# Patient Record
Sex: FEMALE | Race: WHITE | NOT HISPANIC OR LATINO
[De-identification: names, ages, dates, MRNs, and addresses within clinical notes are randomized per-mention and may not be internally consistent; named-entity substitution may affect disease eponyms.]

---

## 2020-08-20 PROBLEM — Z00.00 ENCOUNTER FOR PREVENTIVE HEALTH EXAMINATION: Status: ACTIVE | Noted: 2020-08-20

## 2020-08-21 ENCOUNTER — APPOINTMENT (OUTPATIENT)
Dept: INTERVENTIONAL RADIOLOGY/VASCULAR | Facility: CLINIC | Age: 35
End: 2020-08-21

## 2020-09-11 ENCOUNTER — APPOINTMENT (OUTPATIENT)
Dept: MAMMOGRAPHY | Facility: CLINIC | Age: 35
End: 2020-09-11

## 2022-07-13 ENCOUNTER — OUTPATIENT (OUTPATIENT)
Dept: OUTPATIENT SERVICES | Facility: HOSPITAL | Age: 37
LOS: 1 days | End: 2022-07-13
Payer: COMMERCIAL

## 2022-07-13 VITALS
DIASTOLIC BLOOD PRESSURE: 69 MMHG | OXYGEN SATURATION: 99 % | SYSTOLIC BLOOD PRESSURE: 115 MMHG | TEMPERATURE: 98 F | HEART RATE: 86 BPM | RESPIRATION RATE: 18 BRPM

## 2022-07-13 VITALS — SYSTOLIC BLOOD PRESSURE: 127 MMHG | HEART RATE: 83 BPM | OXYGEN SATURATION: 99 % | DIASTOLIC BLOOD PRESSURE: 77 MMHG

## 2022-07-13 DIAGNOSIS — O26.899 OTHER SPECIFIED PREGNANCY RELATED CONDITIONS, UNSPECIFIED TRIMESTER: ICD-10-CM

## 2022-07-13 DIAGNOSIS — Z3A.00 WEEKS OF GESTATION OF PREGNANCY NOT SPECIFIED: ICD-10-CM

## 2022-07-13 PROCEDURE — G0463: CPT

## 2022-07-13 PROCEDURE — 59025 FETAL NON-STRESS TEST: CPT

## 2022-07-13 NOTE — OB PROVIDER TRIAGE NOTE - ADDITIONAL INSTRUCTIONS
Resume normal prenatal care. Please call your doctor with any questions or concerns. Please report back to the hospital if you experience fevers, chills, nausea, vomiting, painful contractions, vaginal bleeding, loss of fluid or if you experience decreased fetal movement.

## 2022-07-13 NOTE — OB PROVIDER TRIAGE NOTE - NSHPPHYSICALEXAM_GEN_ALL_CORE
VS  T(C): 37.0 (07-13-22 @ 17:21)  HR: 83 (07-13-22 @ 17:28)  BP: 127/77 (07-13-22 @ 17:28)  RR: 18 (07-13-22 @ 17:21)  SpO2: 99% (07-13-22 @ 17:28)    Gen: NAD  Resp: unlabored on RA  CV: RR  GI: soft, nontender, gravid    SSE: - pooling, - ferning, - nitrazine; cervix long and closed; physiologic discharge  BPP: 8/8, vertex, posterior, MVP 4.2    FHT: 140, mod, + accels, - decels  [place]: no CTX

## 2022-07-13 NOTE — OB RN TRIAGE NOTE - FALL HARM RISK - UNIVERSAL INTERVENTIONS
Bed in lowest position, wheels locked, appropriate side rails in place/Call bell, personal items and telephone in reach/Instruct patient to call for assistance before getting out of bed or chair/Non-slip footwear when patient is out of bed/[place] to call system/Physically safe environment - no spills, clutter or unnecessary equipment/Purposeful Proactive Rounding/Room/bathroom lighting operational, light cord in reach

## 2022-07-13 NOTE — OB PROVIDER TRIAGE NOTE - HISTORY OF PRESENT ILLNESS
35yo  at 28 5/ (ISHA ) presenting with complaints of leakage of fluid. Pt reports feeling wet for the past 1 month. Denies large gush of fluid. Reports good fetal movement. Denies vaginal bleeding, denies contractions.    PNC: IVF pregnancy  diagnosed with gestational thrombocytopenia, last platelet level 122 per patient    OBHx: ectopic s/p MTX  GynHx: <1cm fibroids, complex cyst (unknown size/side), history of abnormal pap smears s/p colposcopy in the past, denies STIs  PMH: denies  PSH: denies  Social: denies anxiety/depression  Meds: PNV, ASA  All: NKDA

## 2022-10-02 ENCOUNTER — INPATIENT (INPATIENT)
Facility: HOSPITAL | Age: 37
LOS: 2 days | Discharge: HOME | End: 2022-10-05
Attending: OBSTETRICS & GYNECOLOGY | Admitting: OBSTETRICS & GYNECOLOGY
Payer: COMMERCIAL

## 2022-10-02 VITALS
RESPIRATION RATE: 17 BRPM | SYSTOLIC BLOOD PRESSURE: 138 MMHG | TEMPERATURE: 98 F | DIASTOLIC BLOOD PRESSURE: 89 MMHG | HEART RATE: 96 BPM | OXYGEN SATURATION: 99 %

## 2022-10-02 DIAGNOSIS — O26.899 OTHER SPECIFIED PREGNANCY RELATED CONDITIONS, UNSPECIFIED TRIMESTER: ICD-10-CM

## 2022-10-02 DIAGNOSIS — Z34.80 ENCOUNTER FOR SUPERVISION OF OTHER NORMAL PREGNANCY, UNSPECIFIED TRIMESTER: ICD-10-CM

## 2022-10-02 DIAGNOSIS — Z3A.00 WEEKS OF GESTATION OF PREGNANCY NOT SPECIFIED: ICD-10-CM

## 2022-10-02 LAB
ALBUMIN SERPL ELPH-MCNC: 3.8 G/DL — SIGNIFICANT CHANGE UP (ref 3.3–5)
ALP SERPL-CCNC: 174 U/L — HIGH (ref 40–120)
ALT FLD-CCNC: 12 U/L — SIGNIFICANT CHANGE UP (ref 10–45)
ANION GAP SERPL CALC-SCNC: 14 MMOL/L — SIGNIFICANT CHANGE UP (ref 5–17)
APPEARANCE UR: CLEAR — SIGNIFICANT CHANGE UP
APTT BLD: 24.9 SEC — LOW (ref 27.5–35.5)
AST SERPL-CCNC: 12 U/L — SIGNIFICANT CHANGE UP (ref 10–40)
BACTERIA # UR AUTO: NEGATIVE — SIGNIFICANT CHANGE UP
BASOPHILS # BLD AUTO: 0.03 K/UL — SIGNIFICANT CHANGE UP (ref 0–0.2)
BASOPHILS NFR BLD AUTO: 0.2 % — SIGNIFICANT CHANGE UP (ref 0–2)
BILIRUB SERPL-MCNC: 0.1 MG/DL — LOW (ref 0.2–1.2)
BILIRUB UR-MCNC: NEGATIVE — SIGNIFICANT CHANGE UP
BLD GP AB SCN SERPL QL: NEGATIVE — SIGNIFICANT CHANGE UP
BUN SERPL-MCNC: 9 MG/DL — SIGNIFICANT CHANGE UP (ref 7–23)
CALCIUM SERPL-MCNC: 9.2 MG/DL — SIGNIFICANT CHANGE UP (ref 8.4–10.5)
CHLORIDE SERPL-SCNC: 103 MMOL/L — SIGNIFICANT CHANGE UP (ref 96–108)
CO2 SERPL-SCNC: 18 MMOL/L — LOW (ref 22–31)
COLOR SPEC: YELLOW — SIGNIFICANT CHANGE UP
COVID-19 SPIKE DOMAIN AB INTERP: POSITIVE
COVID-19 SPIKE DOMAIN ANTIBODY RESULT: >250 U/ML — HIGH
CREAT ?TM UR-MCNC: 156 MG/DL — SIGNIFICANT CHANGE UP
CREAT SERPL-MCNC: 0.43 MG/DL — LOW (ref 0.5–1.3)
DIFF PNL FLD: ABNORMAL
EGFR: 128 ML/MIN/1.73M2 — SIGNIFICANT CHANGE UP
EOSINOPHIL # BLD AUTO: 0.09 K/UL — SIGNIFICANT CHANGE UP (ref 0–0.5)
EOSINOPHIL NFR BLD AUTO: 0.7 % — SIGNIFICANT CHANGE UP (ref 0–6)
EPI CELLS # UR: 5 /HPF — SIGNIFICANT CHANGE UP
FIBRINOGEN PPP-MCNC: 577 MG/DL — HIGH (ref 330–520)
GLUCOSE SERPL-MCNC: 129 MG/DL — HIGH (ref 70–99)
GLUCOSE UR QL: ABNORMAL
HCT VFR BLD CALC: 37.3 % — SIGNIFICANT CHANGE UP (ref 34.5–45)
HGB BLD-MCNC: 13.3 G/DL — SIGNIFICANT CHANGE UP (ref 11.5–15.5)
HYALINE CASTS # UR AUTO: 4 /LPF — HIGH (ref 0–2)
IMM GRANULOCYTES NFR BLD AUTO: 0.6 % — SIGNIFICANT CHANGE UP (ref 0–0.9)
INR BLD: 0.93 RATIO — SIGNIFICANT CHANGE UP (ref 0.88–1.16)
KETONES UR-MCNC: SIGNIFICANT CHANGE UP
LDH SERPL L TO P-CCNC: 145 U/L — SIGNIFICANT CHANGE UP (ref 50–242)
LEUKOCYTE ESTERASE UR-ACNC: NEGATIVE — SIGNIFICANT CHANGE UP
LYMPHOCYTES # BLD AUTO: 0.97 K/UL — LOW (ref 1–3.3)
LYMPHOCYTES # BLD AUTO: 7.3 % — LOW (ref 13–44)
MCHC RBC-ENTMCNC: 31.7 PG — SIGNIFICANT CHANGE UP (ref 27–34)
MCHC RBC-ENTMCNC: 35.7 GM/DL — SIGNIFICANT CHANGE UP (ref 32–36)
MCV RBC AUTO: 89 FL — SIGNIFICANT CHANGE UP (ref 80–100)
MONOCYTES # BLD AUTO: 1.15 K/UL — HIGH (ref 0–0.9)
MONOCYTES NFR BLD AUTO: 8.6 % — SIGNIFICANT CHANGE UP (ref 2–14)
NEUTROPHILS # BLD AUTO: 11.04 K/UL — HIGH (ref 1.8–7.4)
NEUTROPHILS NFR BLD AUTO: 82.6 % — HIGH (ref 43–77)
NITRITE UR-MCNC: NEGATIVE — SIGNIFICANT CHANGE UP
NRBC # BLD: 0 /100 WBCS — SIGNIFICANT CHANGE UP (ref 0–0)
PH UR: 6 — SIGNIFICANT CHANGE UP (ref 5–8)
PLATELET # BLD AUTO: 133 K/UL — LOW (ref 150–400)
POTASSIUM SERPL-MCNC: 3.9 MMOL/L — SIGNIFICANT CHANGE UP (ref 3.5–5.3)
POTASSIUM SERPL-SCNC: 3.9 MMOL/L — SIGNIFICANT CHANGE UP (ref 3.5–5.3)
PROT ?TM UR-MCNC: 34 MG/DL — HIGH (ref 0–12)
PROT SERPL-MCNC: 6.6 G/DL — SIGNIFICANT CHANGE UP (ref 6–8.3)
PROT UR-MCNC: ABNORMAL
PROT/CREAT UR-RTO: 0.2 RATIO — SIGNIFICANT CHANGE UP (ref 0–0.2)
PROTHROM AB SERPL-ACNC: 10.8 SEC — SIGNIFICANT CHANGE UP (ref 10.5–13.4)
RBC # BLD: 4.19 M/UL — SIGNIFICANT CHANGE UP (ref 3.8–5.2)
RBC # FLD: 12.4 % — SIGNIFICANT CHANGE UP (ref 10.3–14.5)
RBC CASTS # UR COMP ASSIST: 49 /HPF — HIGH (ref 0–4)
RH IG SCN BLD-IMP: POSITIVE — SIGNIFICANT CHANGE UP
SARS-COV-2 IGG+IGM SERPL QL IA: >250 U/ML — HIGH
SARS-COV-2 IGG+IGM SERPL QL IA: POSITIVE
SARS-COV-2 RNA SPEC QL NAA+PROBE: SIGNIFICANT CHANGE UP
SODIUM SERPL-SCNC: 135 MMOL/L — SIGNIFICANT CHANGE UP (ref 135–145)
SP GR SPEC: 1.03 — HIGH (ref 1.01–1.02)
URATE SERPL-MCNC: 4.1 MG/DL — SIGNIFICANT CHANGE UP (ref 2.5–7)
UROBILINOGEN FLD QL: NEGATIVE — SIGNIFICANT CHANGE UP
WBC # BLD: 13.36 K/UL — HIGH (ref 3.8–10.5)
WBC # FLD AUTO: 13.36 K/UL — HIGH (ref 3.8–10.5)
WBC UR QL: 3 /HPF — SIGNIFICANT CHANGE UP (ref 0–5)

## 2022-10-02 RX ORDER — CHLORHEXIDINE GLUCONATE 213 G/1000ML
1 SOLUTION TOPICAL ONCE
Refills: 0 | Status: DISCONTINUED | OUTPATIENT
Start: 2022-10-02 | End: 2022-10-03

## 2022-10-02 RX ORDER — CITRIC ACID/SODIUM CITRATE 300-500 MG
15 SOLUTION, ORAL ORAL EVERY 6 HOURS
Refills: 0 | Status: DISCONTINUED | OUTPATIENT
Start: 2022-10-02 | End: 2022-10-03

## 2022-10-02 RX ORDER — INFLUENZA VIRUS VACCINE 15; 15; 15; 15 UG/.5ML; UG/.5ML; UG/.5ML; UG/.5ML
0.5 SUSPENSION INTRAMUSCULAR ONCE
Refills: 0 | Status: DISCONTINUED | OUTPATIENT
Start: 2022-10-02 | End: 2022-10-05

## 2022-10-02 RX ORDER — OXYTOCIN 10 UNIT/ML
333.33 VIAL (ML) INJECTION
Qty: 20 | Refills: 0 | Status: DISCONTINUED | OUTPATIENT
Start: 2022-10-02 | End: 2022-10-05

## 2022-10-02 RX ORDER — ONDANSETRON 8 MG/1
4 TABLET, FILM COATED ORAL ONCE
Refills: 0 | Status: COMPLETED | OUTPATIENT
Start: 2022-10-02 | End: 2022-10-02

## 2022-10-02 RX ORDER — OXYTOCIN 10 UNIT/ML
4 VIAL (ML) INJECTION
Qty: 30 | Refills: 0 | Status: DISCONTINUED | OUTPATIENT
Start: 2022-10-02 | End: 2022-10-05

## 2022-10-02 RX ORDER — SODIUM CHLORIDE 9 MG/ML
1000 INJECTION, SOLUTION INTRAVENOUS
Refills: 0 | Status: DISCONTINUED | OUTPATIENT
Start: 2022-10-02 | End: 2022-10-03

## 2022-10-02 RX ORDER — SODIUM CHLORIDE 9 MG/ML
1000 INJECTION, SOLUTION INTRAVENOUS
Refills: 0 | Status: DISCONTINUED | OUTPATIENT
Start: 2022-10-02 | End: 2022-10-05

## 2022-10-02 RX ADMIN — ONDANSETRON 4 MILLIGRAM(S): 8 TABLET, FILM COATED ORAL at 08:15

## 2022-10-02 RX ADMIN — Medication 15 MILLILITER(S): at 08:05

## 2022-10-02 RX ADMIN — Medication 15 MILLILITER(S): at 15:59

## 2022-10-02 RX ADMIN — Medication 4 MILLIUNIT(S)/MIN: at 23:36

## 2022-10-02 RX ADMIN — SODIUM CHLORIDE 125 MILLILITER(S): 9 INJECTION, SOLUTION INTRAVENOUS at 08:16

## 2022-10-02 RX ADMIN — ONDANSETRON 4 MILLIGRAM(S): 8 TABLET, FILM COATED ORAL at 08:05

## 2022-10-02 RX ADMIN — SODIUM CHLORIDE 125 MILLILITER(S): 9 INJECTION, SOLUTION INTRAVENOUS at 08:46

## 2022-10-02 NOTE — OB PROVIDER H&P - NSHPPHYSICALEXAM_GEN_ALL_CORE
Physical Exam  CV: RRR  Pulm: breathing comfortably on RA  Abd: gravid, nontender  Extr: moving all extremities with ease    – Spec: (-)pooling, (-)ferning, (-)nitrazine  – VE: 0.5/80/-3  – FHT: baseline 130, mod variability, +accels, -decels  – [place]: irregular, q10min  – EFW: 3122g by extrapolation from sono at 36w  – [place]: vertex

## 2022-10-02 NOTE — OB RN DELIVERY SUMMARY - NSSELHIDDEN_OBGYN_ALL_OB_FT
[NS_DeliveryAttending1_OBGYN_ALL_OB_FT:[id number]],[NS_DeliveryRN_OBGYN_ALL_OB_FT:[id number]]

## 2022-10-02 NOTE — OB PROVIDER TRIAGE NOTE - HISTORY OF PRESENT ILLNESS
R1 Triage H&P    Subjective  HPI: 37y  at 40w2d presents for leakage of fluid since 2am. +FM. +LOF. +CTXs. -VB. Pt denies any other concerns.    Patient endorses a gush of clear fluid around 2am, lightly saturating a pantiliner afterwards. Additionally started ly every 20 minutes, not requiring pain management currently.    – PNC: Gestational thrombocytopenia.  EFW 3122g by extrapolation from sono 2722g at 36w.  – OBHx: ectopic x1  – GynHx: denies fibroids, cysts, endometriosis, abnormal pap smears, STIs  – PMH: denies  – PSH: denies  – Psych: denies   – Social: denies   – Meds: PNV   – Allergies: NKDA  – Will accept blood transfusions? Yes    Objective  – VS  T(C): 36.7 (10-02-22 @ 03:19)  HR: 84 (10-02-22 @ 04:05)  BP: 120/74 (10-02-22 @ 04:05)  RR: 17 (10-02-22 @ 03:18)  SpO2: 95% (10-02-22 @ 04:04)    Physical Exam  CV: RRR  Pulm: breathing comfortably on RA  Abd: gravid, nontender  Extr: moving all extremities with ease    – Spec: (-)pooling, (-)ferning, (-)nitrazine  – VE: 0.5/80/-3  – FHT: baseline 130, mod variability, +accels, -decels  – [place]: irregular, q10min  – EFW: 3122g by extrapolation from sono at 36w  – [place]: vertex

## 2022-10-02 NOTE — PRE-ANESTHESIA EVALUATION ADULT - NSANTHOSAYNRD_GEN_A_CORE
No. NOAH screening performed.  STOP BANG Legend: 0-2 = LOW Risk; 3-4 = INTERMEDIATE Risk; 5-8 = HIGH Risk

## 2022-10-02 NOTE — OB PROVIDER LABOR PROGRESS NOTE - ASSESSMENT
36yo  @40+2 IOL for gHTN    - SVE: 10/100/0, OA  - FHT Cat 1, reassuring, ctm  - Peanut ball  - Anticipate     d/w Dr. Villavicencio who is en route  ENRIQUE F[place]on, PGY2

## 2022-10-02 NOTE — OB PROVIDER H&P - ATTENDING COMMENTS
Pt evaluated at bedside. IOL for gHTN by 2 elevated BPs 4hrs apart. No PEC symptoms    Cat 1     Plan  -PO cytotec  -Monitor BP  -HELLP labs  -epidural prn

## 2022-10-02 NOTE — OB PROVIDER H&P - HISTORY OF PRESENT ILLNESS
Subjective  HPI: 37y  at 40w2d presents for leakage of fluid since 2am. +FM. +LOF. +CTXs. -VB. Pt denies any other concerns.    Patient endorses a gush of clear fluid around 2am, lightly saturating a pantiliner afterwards. Additionally started ly every 20 minutes, not requiring pain management currently.    – PNC: Gestational thrombocytopenia.  EFW 3122g by extrapolation from sono 2722g at 36w.  – OBHx: ectopic x1  – GynHx: denies fibroids, cysts, endometriosis, abnormal pap smears, STIs  – PMH: denies  – PSH: denies  – Psych: denies   – Social: denies   – Meds: PNV   – Allergies: NKDA  – Will accept blood transfusions? Yes    Objective  – VS  T(C): 36.7 (10-02-22 @ 03:19)  HR: 84 (10-02-22 @ 04:05)  BP: 120/74 (10-02-22 @ 04:05)  RR: 17 (10-02-22 @ 03:18)  SpO2: 95% (10-02-22 @ 04:04)

## 2022-10-02 NOTE — OB RN DELIVERY SUMMARY - NS_SEPSISRSKCALC_OBGYN_ALL_OB_FT
EOS calculated successfully. EOS Risk Factor: 0.5/1000 live births ([facility] national incidence); GA=40w3d; Temp=99.68; ROM=10.95; GBS='Negative'; Antibiotics='No antibiotics or any antibiotics < 2 hrs prior to birth'

## 2022-10-02 NOTE — OB PROVIDER TRIAGE NOTE - ATTENDING COMMENTS
Pt evaluated at bedside. Now meets criteria for gHTN. No PEC symptoms. She is feeling intermittent ctx. Otherwise feeling well.     Cat 1 tracing    Plan  -PO Cytotec  -EFM + toco cat 1   -Epidural PRN  -HELLP Labs    ryan

## 2022-10-02 NOTE — OB RN TRIAGE NOTE - FALL HARM RISK - UNIVERSAL INTERVENTIONS
Bed in lowest position, wheels locked, appropriate side rails in place/Call bell, personal items and telephone in reach/Instruct patient to call for assistance before getting out of bed or chair/Non-slip footwear when patient is out of bed/[place] to call system/Physically safe environment - no spills, clutter or unnecessary equipment/Purposeful Proactive Rounding/Room/bathroom lighting operational, light cord in reach

## 2022-10-02 NOTE — OB RN DELIVERY SUMMARY - DELIVERY COMPLICATIONS; SHOULDER DYSTOCIA
L shoulder dystocia for 10 seconds. superpubic pressure applied and medrano corkscrew maneuver. delivered posterior arm.

## 2022-10-02 NOTE — OB PROVIDER H&P - ASSESSMENT
A&P: 37 year  presents for IOL 2/2 gHTN  Labor: admit to L&D  -PO cytotec  -routine labs  -EFM/toco  -NPO, IV hydration  Fetal: cat 1 tracing, fetal status reassuring  GBS: neg  Analgesia: Epi      d/w Dr Saud García PGY2

## 2022-10-02 NOTE — OB PROVIDER H&P - ATTENDING SUPERVISION STATEMENT
Please call mom and see if she is OK with referrals, I believe that she might benefit for services. Please ask how are the girls with the sores, thanks.    Resident

## 2022-10-02 NOTE — OB PROVIDER TRIAGE NOTE - NSOBPROVIDERNOTE_OBGYN_ALL_OB_FT
Assessment  37y  at 40w2d presents for rule out labor, rule out rupture - not ruptured on exam and with no cervical change, ly irregularly on monitor.    Plan  - SHANICE 5.4 w/ MVP 2.2, BPP 8/8, NST reactive and reassuring  - d/c with labor precautions  - Pt counseled on return to triage if experiencing decreased fetal movement, regular contractions every 3-4 minutes, leakage of fluid, vaginal bleeding    Plan per attending physician, Dr. Saud Olmos MD  PGY1 Assessment  37y  at 40w2d presents for rule out labor, rule out rupture - not ruptured on exam and with no cervical change, ly irregularly on monitor.    Plan  - SHANICE 5.4 w/ MVP 2.2, BPP 8/8, NST reactive and reassuring  - d/c with labor precautions  - Pt counseled on return to triage if experiencing decreased fetal movement, regular contractions every 3-4 minutes, leakage of fluid, vaginal bleeding    Addendum:  - Pt met criteria for gestational hypertension however refusing induction at this time  - HELLP labs sent, pending  - f/u plan pending HELLP labs    Plan per attending physician, Dr. Saud Olmos MD  PGY1

## 2022-10-02 NOTE — OB PROVIDER LABOR PROGRESS NOTE - NS_SUBJECTIVE/OBJECTIVE_OBGYN_ALL_OB_FT
Patient having intermittent late decels. Cytotec withheld because contractions difficult to trace. IUPC explained but was not placed because patient found to be fully.    T(C): 37.5 (10-02-22 @ 18:36), Max: 37.5 (10-02-22 @ 18:36)  HR: 109 (10-02-22 @ 21:22) (77 - 124)  BP: 129/70 (10-02-22 @ 21:20) (109/57 - 163/86)  RR: 18 (10-02-22 @ 18:36) (17 - 18)  SpO2: 100% (10-02-22 @ 21:22) (92% - 100%)

## 2022-10-03 LAB
BASOPHILS # BLD AUTO: 0 K/UL — SIGNIFICANT CHANGE UP (ref 0–0.2)
BASOPHILS NFR BLD AUTO: 0 % — SIGNIFICANT CHANGE UP (ref 0–2)
EOSINOPHIL # BLD AUTO: 0 K/UL — SIGNIFICANT CHANGE UP (ref 0–0.5)
EOSINOPHIL NFR BLD AUTO: 0 % — SIGNIFICANT CHANGE UP (ref 0–6)
HCT VFR BLD CALC: 20.2 % — CRITICAL LOW (ref 34.5–45)
HCT VFR BLD CALC: 23.1 % — LOW (ref 34.5–45)
HGB BLD-MCNC: 7 G/DL — CRITICAL LOW (ref 11.5–15.5)
HGB BLD-MCNC: 7.9 G/DL — LOW (ref 11.5–15.5)
LYMPHOCYTES # BLD AUTO: 0 % — LOW (ref 13–44)
LYMPHOCYTES # BLD AUTO: 0 K/UL — LOW (ref 1–3.3)
MANUAL SMEAR VERIFICATION: SIGNIFICANT CHANGE UP
MCHC RBC-ENTMCNC: 31.3 PG — SIGNIFICANT CHANGE UP (ref 27–34)
MCHC RBC-ENTMCNC: 31.6 PG — SIGNIFICANT CHANGE UP (ref 27–34)
MCHC RBC-ENTMCNC: 34.2 GM/DL — SIGNIFICANT CHANGE UP (ref 32–36)
MCHC RBC-ENTMCNC: 34.7 GM/DL — SIGNIFICANT CHANGE UP (ref 32–36)
MCV RBC AUTO: 90.2 FL — SIGNIFICANT CHANGE UP (ref 80–100)
MCV RBC AUTO: 92.4 FL — SIGNIFICANT CHANGE UP (ref 80–100)
METAMYELOCYTES # FLD: 0.9 % — HIGH (ref 0–0)
MONOCYTES # BLD AUTO: 0.67 K/UL — SIGNIFICANT CHANGE UP (ref 0–0.9)
MONOCYTES NFR BLD AUTO: 2.6 % — SIGNIFICANT CHANGE UP (ref 2–14)
NEUTROPHILS # BLD AUTO: 24.91 K/UL — HIGH (ref 1.8–7.4)
NEUTROPHILS NFR BLD AUTO: 93.9 % — HIGH (ref 43–77)
NEUTS BAND # BLD: 2.6 % — SIGNIFICANT CHANGE UP (ref 0–8)
NRBC # BLD: 0 /100 WBCS — SIGNIFICANT CHANGE UP (ref 0–0)
PLAT MORPH BLD: NORMAL — SIGNIFICANT CHANGE UP
PLATELET # BLD AUTO: 102 K/UL — LOW (ref 150–400)
PLATELET # BLD AUTO: 99 K/UL — LOW (ref 150–400)
RBC # BLD: 2.24 M/UL — LOW (ref 3.8–5.2)
RBC # BLD: 2.5 M/UL — LOW (ref 3.8–5.2)
RBC # FLD: 12.9 % — SIGNIFICANT CHANGE UP (ref 10.3–14.5)
RBC # FLD: 13 % — SIGNIFICANT CHANGE UP (ref 10.3–14.5)
RBC BLD AUTO: SIGNIFICANT CHANGE UP
T PALLIDUM AB TITR SER: NEGATIVE — SIGNIFICANT CHANGE UP
WBC # BLD: 16.89 K/UL — HIGH (ref 3.8–10.5)
WBC # BLD: 25.81 K/UL — HIGH (ref 3.8–10.5)
WBC # FLD AUTO: 16.89 K/UL — HIGH (ref 3.8–10.5)
WBC # FLD AUTO: 25.81 K/UL — HIGH (ref 3.8–10.5)

## 2022-10-03 RX ORDER — TETANUS TOXOID, REDUCED DIPHTHERIA TOXOID AND ACELLULAR PERTUSSIS VACCINE, ADSORBED 5; 2.5; 8; 8; 2.5 [IU]/.5ML; [IU]/.5ML; UG/.5ML; UG/.5ML; UG/.5ML
0.5 SUSPENSION INTRAMUSCULAR ONCE
Refills: 0 | Status: DISCONTINUED | OUTPATIENT
Start: 2022-10-03 | End: 2022-10-05

## 2022-10-03 RX ORDER — AER TRAVELER 0.5 G/1
1 SOLUTION RECTAL; TOPICAL EVERY 4 HOURS
Refills: 0 | Status: DISCONTINUED | OUTPATIENT
Start: 2022-10-03 | End: 2022-10-05

## 2022-10-03 RX ORDER — SODIUM CHLORIDE 9 MG/ML
1000 INJECTION, SOLUTION INTRAVENOUS ONCE
Refills: 0 | Status: COMPLETED | OUTPATIENT
Start: 2022-10-03 | End: 2022-10-03

## 2022-10-03 RX ORDER — DIPHENHYDRAMINE HCL 50 MG
25 CAPSULE ORAL EVERY 6 HOURS
Refills: 0 | Status: DISCONTINUED | OUTPATIENT
Start: 2022-10-03 | End: 2022-10-05

## 2022-10-03 RX ORDER — HYDROCORTISONE 1 %
1 OINTMENT (GRAM) TOPICAL EVERY 6 HOURS
Refills: 0 | Status: DISCONTINUED | OUTPATIENT
Start: 2022-10-03 | End: 2022-10-05

## 2022-10-03 RX ORDER — OXYCODONE HYDROCHLORIDE 5 MG/1
5 TABLET ORAL
Refills: 0 | Status: DISCONTINUED | OUTPATIENT
Start: 2022-10-03 | End: 2022-10-05

## 2022-10-03 RX ORDER — LANOLIN
1 OINTMENT (GRAM) TOPICAL EVERY 6 HOURS
Refills: 0 | Status: DISCONTINUED | OUTPATIENT
Start: 2022-10-03 | End: 2022-10-05

## 2022-10-03 RX ORDER — BENZOCAINE AND MENTHOL 5; 1 G/100ML; G/100ML
1 LIQUID ORAL EVERY 4 HOURS
Refills: 0 | Status: DISCONTINUED | OUTPATIENT
Start: 2022-10-03 | End: 2022-10-05

## 2022-10-03 RX ORDER — BENZOCAINE 10 %
1 GEL (GRAM) MUCOUS MEMBRANE EVERY 6 HOURS
Refills: 0 | Status: DISCONTINUED | OUTPATIENT
Start: 2022-10-03 | End: 2022-10-05

## 2022-10-03 RX ORDER — IBUPROFEN 200 MG
600 TABLET ORAL EVERY 6 HOURS
Refills: 0 | Status: DISCONTINUED | OUTPATIENT
Start: 2022-10-03 | End: 2022-10-05

## 2022-10-03 RX ORDER — MAGNESIUM HYDROXIDE 400 MG/1
30 TABLET, CHEWABLE ORAL
Refills: 0 | Status: DISCONTINUED | OUTPATIENT
Start: 2022-10-03 | End: 2022-10-05

## 2022-10-03 RX ORDER — IBUPROFEN 200 MG
600 TABLET ORAL EVERY 6 HOURS
Refills: 0 | Status: COMPLETED | OUTPATIENT
Start: 2022-10-03 | End: 2023-09-01

## 2022-10-03 RX ORDER — DIBUCAINE 1 %
1 OINTMENT (GRAM) RECTAL EVERY 6 HOURS
Refills: 0 | Status: DISCONTINUED | OUTPATIENT
Start: 2022-10-03 | End: 2022-10-05

## 2022-10-03 RX ORDER — PRAMOXINE HYDROCHLORIDE 150 MG/15G
1 AEROSOL, FOAM RECTAL EVERY 4 HOURS
Refills: 0 | Status: DISCONTINUED | OUTPATIENT
Start: 2022-10-03 | End: 2022-10-05

## 2022-10-03 RX ORDER — ACETAMINOPHEN 500 MG
975 TABLET ORAL
Refills: 0 | Status: DISCONTINUED | OUTPATIENT
Start: 2022-10-03 | End: 2022-10-05

## 2022-10-03 RX ORDER — SIMETHICONE 80 MG/1
80 TABLET, CHEWABLE ORAL EVERY 4 HOURS
Refills: 0 | Status: DISCONTINUED | OUTPATIENT
Start: 2022-10-03 | End: 2022-10-05

## 2022-10-03 RX ORDER — SODIUM CHLORIDE 9 MG/ML
3 INJECTION INTRAMUSCULAR; INTRAVENOUS; SUBCUTANEOUS EVERY 8 HOURS
Refills: 0 | Status: DISCONTINUED | OUTPATIENT
Start: 2022-10-03 | End: 2022-10-05

## 2022-10-03 RX ORDER — SENNA PLUS 8.6 MG/1
2 TABLET ORAL AT BEDTIME
Refills: 0 | Status: DISCONTINUED | OUTPATIENT
Start: 2022-10-03 | End: 2022-10-05

## 2022-10-03 RX ORDER — ERTAPENEM SODIUM 1 G/1
1000 INJECTION, POWDER, LYOPHILIZED, FOR SOLUTION INTRAMUSCULAR; INTRAVENOUS EVERY 24 HOURS
Refills: 0 | Status: DISCONTINUED | OUTPATIENT
Start: 2022-10-03 | End: 2022-10-04

## 2022-10-03 RX ORDER — OXYTOCIN 10 UNIT/ML
10 VIAL (ML) INJECTION ONCE
Refills: 0 | Status: COMPLETED | OUTPATIENT
Start: 2022-10-03 | End: 2022-10-03

## 2022-10-03 RX ORDER — OXYTOCIN 10 UNIT/ML
333.33 VIAL (ML) INJECTION
Qty: 20 | Refills: 0 | Status: DISCONTINUED | OUTPATIENT
Start: 2022-10-03 | End: 2022-10-05

## 2022-10-03 RX ORDER — KETOROLAC TROMETHAMINE 30 MG/ML
30 SYRINGE (ML) INJECTION ONCE
Refills: 0 | Status: DISCONTINUED | OUTPATIENT
Start: 2022-10-03 | End: 2022-10-03

## 2022-10-03 RX ADMIN — Medication 30 MILLIGRAM(S): at 04:22

## 2022-10-03 RX ADMIN — Medication 975 MILLIGRAM(S): at 10:45

## 2022-10-03 RX ADMIN — BENZOCAINE AND MENTHOL 1 LOZENGE: 5; 1 LIQUID ORAL at 22:39

## 2022-10-03 RX ADMIN — Medication 1 TABLET(S): at 12:15

## 2022-10-03 RX ADMIN — Medication 30 MILLIGRAM(S): at 03:45

## 2022-10-03 RX ADMIN — BENZOCAINE AND MENTHOL 1 LOZENGE: 5; 1 LIQUID ORAL at 10:43

## 2022-10-03 RX ADMIN — OXYCODONE HYDROCHLORIDE 5 MILLIGRAM(S): 5 TABLET ORAL at 10:00

## 2022-10-03 RX ADMIN — Medication 975 MILLIGRAM(S): at 18:27

## 2022-10-03 RX ADMIN — OXYCODONE HYDROCHLORIDE 5 MILLIGRAM(S): 5 TABLET ORAL at 09:30

## 2022-10-03 RX ADMIN — Medication 10 UNIT(S): at 01:34

## 2022-10-03 RX ADMIN — ERTAPENEM SODIUM 120 MILLIGRAM(S): 1 INJECTION, POWDER, LYOPHILIZED, FOR SOLUTION INTRAMUSCULAR; INTRAVENOUS at 03:49

## 2022-10-03 RX ADMIN — Medication 600 MILLIGRAM(S): at 10:00

## 2022-10-03 RX ADMIN — Medication 600 MILLIGRAM(S): at 01:04

## 2022-10-03 RX ADMIN — SENNA PLUS 2 TABLET(S): 8.6 TABLET ORAL at 22:39

## 2022-10-03 RX ADMIN — Medication 975 MILLIGRAM(S): at 12:16

## 2022-10-03 RX ADMIN — SODIUM CHLORIDE 1000 MILLILITER(S): 9 INJECTION, SOLUTION INTRAVENOUS at 04:16

## 2022-10-03 RX ADMIN — Medication 600 MILLIGRAM(S): at 15:54

## 2022-10-03 RX ADMIN — SODIUM CHLORIDE 3 MILLILITER(S): 9 INJECTION INTRAMUSCULAR; INTRAVENOUS; SUBCUTANEOUS at 22:46

## 2022-10-03 RX ADMIN — Medication 600 MILLIGRAM(S): at 20:51

## 2022-10-03 RX ADMIN — Medication 600 MILLIGRAM(S): at 21:30

## 2022-10-03 RX ADMIN — BENZOCAINE AND MENTHOL 1 LOZENGE: 5; 1 LIQUID ORAL at 15:54

## 2022-10-03 RX ADMIN — Medication 600 MILLIGRAM(S): at 16:45

## 2022-10-03 RX ADMIN — Medication 975 MILLIGRAM(S): at 05:17

## 2022-10-03 NOTE — CHART NOTE - NSCHARTNOTEFT_GEN_A_CORE
Pt is a 38yo  s/p  c/b gHTN, shoulder dystocia, 3 degree laceration, and PPT. EBL was 600.    MD called to bedside because pt's CBC resulted with an H/H 7.0/20.2.      LABS:  cret                        7.0    16.89 )-----------( 99       ( 03 Oct 2022 14:50 )             20.2                7.9    25.81 )-----------( 102       ( 03 Oct 2022 08:24 )             23.1                13.3    13.36 )-----------( 133       ( 02 Oct 2022 06:24 )             37.3     Vital Signs Last 24 Hrs  T(C): 36.8 (03 Oct 2022 13:01), Max: 38 (03 Oct 2022 02:40)  T(F): 98.2 (03 Oct 2022 13:01), Max: 100.4 (03 Oct 2022 02:40)  HR: 98 (03 Oct 2022 13:01) (85 - 153)  BP: 115/79 (03 Oct 2022 13:01) (97/53 - 197/128)  BP(mean): 98 (03 Oct 2022 10:30) (98 - 98)  RR: 18 (03 Oct 2022 13:01) (16 - 18)  SpO2: 98% (03 Oct 2022 13:01) (83% - 100%)    Physical Exam:  General: comfortable, NAD  Skin: pale, periorbital bruising and ruptured blood vessels in her eyes  Abd: Soft, fundus firm  Pelvic: lochia wnl    Pt seen and examined at the bedside. She denies any HA, chest pain, SOB, N/V, RUQ pain, dizziness, palpitations, lightheadedness with ambulation. She is tolerating PO and is urinating independently with adequate urine output. Her only complaint is fatigue and pain in her perineal area. Options were discussed with the patient, and she currently does not want a blood transfusion. She feels it is not necessary at this time, but is open to getting a CBC in the AM. All questions were answered to pt satisfaction. Pt instructed to let us know if she starts to have other or worsening symptoms and we will reevaluate.    Plan:  - Pt fatigued, otherwise asymptomatic  - Vital signs wnl  - AM CBC  - Continue to monitor    Pauline Whitney, PGY1  d/w Dr. Villavicencio R1 Postpartum Evaluation    Pt is a 38yo  s/p  c/b gHTN, shoulder dystocia, 3 degree laceration, and PPT. EBL was 600.    MD called to bedside because pt's CBC resulted with an H/H 7.0/20.2.      LABS:  cret                        7.0    16.89 )-----------( 99       ( 03 Oct 2022 14:50 )             20.2                7.9    25.81 )-----------( 102       ( 03 Oct 2022 08:24 )             23.1                13.3    13.36 )-----------( 133       ( 02 Oct 2022 06:24 )             37.3     Vital Signs Last 24 Hrs  T(C): 36.8 (03 Oct 2022 13:01), Max: 38 (03 Oct 2022 02:40)  T(F): 98.2 (03 Oct 2022 13:01), Max: 100.4 (03 Oct 2022 02:40)  HR: 98 (03 Oct 2022 13:01) (85 - 153)  BP: 115/79 (03 Oct 2022 13:01) (97/53 - 197/128)  BP(mean): 98 (03 Oct 2022 10:30) (98 - 98)  RR: 18 (03 Oct 2022 13:01) (16 - 18)  SpO2: 98% (03 Oct 2022 13:01) (83% - 100%)    Physical Exam:  General: comfortable, NAD  Skin: pale, periorbital bruising and ruptured blood vessels in her eyes  Abd: Soft, fundus firm  Pelvic: lochia wnl    Pt seen and examined at the bedside. She denies any HA, chest pain, SOB, N/V, RUQ pain, dizziness, palpitations, lightheadedness with ambulation. She is tolerating PO and is urinating independently with adequate urine output. Her only complaint is fatigue and pain in her perineal area. Options were discussed with the patient, and she currently does not want a blood transfusion. She feels it is not necessary at this time, but is open to getting a CBC in the AM. All questions were answered to pt satisfaction. Pt instructed to let us know if she starts to have other or worsening symptoms and we will reevaluate.    Plan:  - Pt fatigued, otherwise asymptomatic  - Vital signs wnl  - AM CBC  - Continue to monitor    Pauline Whitney, PGY1  d/w Dr. Villavicencio

## 2022-10-03 NOTE — OB PROVIDER DELIVERY SUMMARY - NSPROVIDERDELIVERYNOTE_OBGYN_ALL_OB_FT
Spontaneous vaginal delivery of liveborn infant in DUTCH position. Head delivered easily. No nuchal cord was noted. Left shoulder dystocia noted. Suprapubic/[place] position. Martinez maneuver and delivery of posterior arm performed. Body delivered easily after. Infant was suctioned. No mec was noted. The cord was cut and clamped and infant handed to Peds. Placenta delivered intact. Uterine fundal massage and post partum pitocin was started. Uterine atony noted. Cytotec SD & IM pit given. Vaginal exam was performed and noted intact cervix, vaginal walls. Bilateral sulcus tears noted and repaired w/ 2.0 vicryl rapide. 3rd degree laceration was noted. Muscle repaired with 0.0 Vicryl in an interrupted fashion. The perineum repaired with 2.0 Vicryl Rapide. Excellent hemostasis was noted. Count was correctx2. Pt. was stable after delivery.

## 2022-10-03 NOTE — OB NEONATOLOGY/PEDIATRICIAN DELIVERY SUMMARY - NSPEDSNEONOTESA_OBGYN_ALL_OB_FT
Called to delivery due to shoulder dystocia and mec for this 40.3wk female born via cephalic  to a 36 y/o  blood type A+ mother, COVID -.  Maternal history of ovarian cyst, gHTN.  No significant prenatal history.  PNL HIV -/Hep B-/RPR non-reactive/Rubella immune, GBS - on 22.  SROM (ROM 11H17M) at 1430 with clear fluids (turning to mec closer to delivery).  Baby emerged vigorous, crying, was warmed/ dried/ suctioned/ stimulated with APGARS of 8/9.  Mom plans to initiate breastfeeding, consents to Hep B vaccine.  Highest maternal temp 37.6C with EOS of 0.39.  PMD is Dr. Uriostegui.

## 2022-10-03 NOTE — OB PROVIDER DELIVERY SUMMARY - NSSELHIDDEN_OBGYN_ALL_OB_FT
[NS_DeliveryAttending1_OBGYN_ALL_OB_FT:[id number]],[NS_DeliveryRN_OBGYN_ALL_OB_FT:[id number]]

## 2022-10-04 ENCOUNTER — TRANSCRIPTION ENCOUNTER (OUTPATIENT)
Age: 37
End: 2022-10-04

## 2022-10-04 LAB
BASOPHILS # BLD AUTO: 0 K/UL — SIGNIFICANT CHANGE UP (ref 0–0.2)
BASOPHILS # BLD AUTO: 0.05 K/UL — SIGNIFICANT CHANGE UP (ref 0–0.2)
BASOPHILS NFR BLD AUTO: 0 % — SIGNIFICANT CHANGE UP (ref 0–2)
BASOPHILS NFR BLD AUTO: 0.4 % — SIGNIFICANT CHANGE UP (ref 0–2)
EOSINOPHIL # BLD AUTO: 0.28 K/UL — SIGNIFICANT CHANGE UP (ref 0–0.5)
EOSINOPHIL # BLD AUTO: 0.38 K/UL — SIGNIFICANT CHANGE UP (ref 0–0.5)
EOSINOPHIL NFR BLD AUTO: 2.1 % — SIGNIFICANT CHANGE UP (ref 0–6)
EOSINOPHIL NFR BLD AUTO: 2.6 % — SIGNIFICANT CHANGE UP (ref 0–6)
HCT VFR BLD CALC: 18.9 % — CRITICAL LOW (ref 34.5–45)
HCT VFR BLD CALC: 23.8 % — LOW (ref 34.5–45)
HGB BLD-MCNC: 6.4 G/DL — CRITICAL LOW (ref 11.5–15.5)
HGB BLD-MCNC: 8.2 G/DL — LOW (ref 11.5–15.5)
IMM GRANULOCYTES NFR BLD AUTO: 0.6 % — SIGNIFICANT CHANGE UP (ref 0–0.9)
LYMPHOCYTES # BLD AUTO: 1.12 K/UL — SIGNIFICANT CHANGE UP (ref 1–3.3)
LYMPHOCYTES # BLD AUTO: 1.4 K/UL — SIGNIFICANT CHANGE UP (ref 1–3.3)
LYMPHOCYTES # BLD AUTO: 8.5 % — LOW (ref 13–44)
LYMPHOCYTES # BLD AUTO: 9.6 % — LOW (ref 13–44)
MANUAL SMEAR VERIFICATION: SIGNIFICANT CHANGE UP
MCHC RBC-ENTMCNC: 31.3 PG — SIGNIFICANT CHANGE UP (ref 27–34)
MCHC RBC-ENTMCNC: 31.5 PG — SIGNIFICANT CHANGE UP (ref 27–34)
MCHC RBC-ENTMCNC: 33.9 GM/DL — SIGNIFICANT CHANGE UP (ref 32–36)
MCHC RBC-ENTMCNC: 34.5 GM/DL — SIGNIFICANT CHANGE UP (ref 32–36)
MCV RBC AUTO: 90.8 FL — SIGNIFICANT CHANGE UP (ref 80–100)
MCV RBC AUTO: 93.1 FL — SIGNIFICANT CHANGE UP (ref 80–100)
MONOCYTES # BLD AUTO: 0.64 K/UL — SIGNIFICANT CHANGE UP (ref 0–0.9)
MONOCYTES # BLD AUTO: 0.93 K/UL — HIGH (ref 0–0.9)
MONOCYTES NFR BLD AUTO: 4.4 % — SIGNIFICANT CHANGE UP (ref 2–14)
MONOCYTES NFR BLD AUTO: 7 % — SIGNIFICANT CHANGE UP (ref 2–14)
NEUTROPHILS # BLD AUTO: 10.76 K/UL — HIGH (ref 1.8–7.4)
NEUTROPHILS # BLD AUTO: 12.16 K/UL — HIGH (ref 1.8–7.4)
NEUTROPHILS NFR BLD AUTO: 81.4 % — HIGH (ref 43–77)
NEUTROPHILS NFR BLD AUTO: 82.5 % — HIGH (ref 43–77)
NEUTS BAND # BLD: 0.9 % — SIGNIFICANT CHANGE UP (ref 0–8)
NRBC # BLD: 0 /100 WBCS — SIGNIFICANT CHANGE UP (ref 0–0)
PLAT MORPH BLD: NORMAL — SIGNIFICANT CHANGE UP
PLATELET # BLD AUTO: 115 K/UL — LOW (ref 150–400)
PLATELET # BLD AUTO: 95 K/UL — LOW (ref 150–400)
RBC # BLD: 2.03 M/UL — LOW (ref 3.8–5.2)
RBC # BLD: 2.62 M/UL — LOW (ref 3.8–5.2)
RBC # FLD: 13.2 % — SIGNIFICANT CHANGE UP (ref 10.3–14.5)
RBC # FLD: 13.3 % — SIGNIFICANT CHANGE UP (ref 10.3–14.5)
RBC BLD AUTO: SIGNIFICANT CHANGE UP
WBC # BLD: 13.22 K/UL — HIGH (ref 3.8–10.5)
WBC # BLD: 14.58 K/UL — HIGH (ref 3.8–10.5)
WBC # FLD AUTO: 13.22 K/UL — HIGH (ref 3.8–10.5)
WBC # FLD AUTO: 14.58 K/UL — HIGH (ref 3.8–10.5)

## 2022-10-04 RX ORDER — ACETAMINOPHEN 500 MG
3 TABLET ORAL
Qty: 0 | Refills: 0 | DISCHARGE
Start: 2022-10-04

## 2022-10-04 RX ORDER — SIMETHICONE 80 MG/1
1 TABLET, CHEWABLE ORAL
Qty: 0 | Refills: 0 | DISCHARGE
Start: 2022-10-04

## 2022-10-04 RX ORDER — DIPHENHYDRAMINE HCL 50 MG
25 CAPSULE ORAL ONCE
Refills: 0 | Status: COMPLETED | OUTPATIENT
Start: 2022-10-04 | End: 2022-10-04

## 2022-10-04 RX ORDER — IBUPROFEN 200 MG
1 TABLET ORAL
Qty: 0 | Refills: 0 | DISCHARGE
Start: 2022-10-04

## 2022-10-04 RX ADMIN — ERTAPENEM SODIUM 120 MILLIGRAM(S): 1 INJECTION, POWDER, LYOPHILIZED, FOR SOLUTION INTRAMUSCULAR; INTRAVENOUS at 04:11

## 2022-10-04 RX ADMIN — SODIUM CHLORIDE 3 MILLILITER(S): 9 INJECTION INTRAMUSCULAR; INTRAVENOUS; SUBCUTANEOUS at 21:10

## 2022-10-04 RX ADMIN — Medication 975 MILLIGRAM(S): at 06:39

## 2022-10-04 RX ADMIN — Medication 600 MILLIGRAM(S): at 05:00

## 2022-10-04 RX ADMIN — Medication 975 MILLIGRAM(S): at 01:30

## 2022-10-04 RX ADMIN — Medication 975 MILLIGRAM(S): at 12:52

## 2022-10-04 RX ADMIN — Medication 600 MILLIGRAM(S): at 21:10

## 2022-10-04 RX ADMIN — Medication 600 MILLIGRAM(S): at 04:11

## 2022-10-04 RX ADMIN — Medication 600 MILLIGRAM(S): at 16:00

## 2022-10-04 RX ADMIN — Medication 600 MILLIGRAM(S): at 09:43

## 2022-10-04 RX ADMIN — SODIUM CHLORIDE 3 MILLILITER(S): 9 INJECTION INTRAMUSCULAR; INTRAVENOUS; SUBCUTANEOUS at 11:29

## 2022-10-04 RX ADMIN — Medication 600 MILLIGRAM(S): at 21:45

## 2022-10-04 RX ADMIN — Medication 1 TABLET(S): at 12:00

## 2022-10-04 RX ADMIN — BENZOCAINE AND MENTHOL 1 LOZENGE: 5; 1 LIQUID ORAL at 06:39

## 2022-10-04 RX ADMIN — Medication 975 MILLIGRAM(S): at 12:00

## 2022-10-04 RX ADMIN — Medication 25 MILLIGRAM(S): at 09:42

## 2022-10-04 RX ADMIN — Medication 600 MILLIGRAM(S): at 10:20

## 2022-10-04 RX ADMIN — Medication 975 MILLIGRAM(S): at 00:34

## 2022-10-04 RX ADMIN — Medication 975 MILLIGRAM(S): at 18:32

## 2022-10-04 RX ADMIN — Medication 600 MILLIGRAM(S): at 15:21

## 2022-10-04 RX ADMIN — Medication 975 MILLIGRAM(S): at 23:24

## 2022-10-04 RX ADMIN — SODIUM CHLORIDE 3 MILLILITER(S): 9 INJECTION INTRAMUSCULAR; INTRAVENOUS; SUBCUTANEOUS at 06:37

## 2022-10-04 RX ADMIN — Medication 975 MILLIGRAM(S): at 17:50

## 2022-10-04 NOTE — DISCHARGE NOTE OB - CARE PROVIDER_API CALL
Carrington Bauer)  Obstetrics and Gynecology  [address] [place] [address]  Phone: [phone]  Fax: [phone]  Follow Up Time:

## 2022-10-04 NOTE — DISCHARGE NOTE OB - PATIENT PORTAL LINK FT
You can access the FollowMyHealth Patient Portal offered by [facility] by registering at the following website: [email or web address]. By joining [employer]’s FollowMyHealth portal, you will also be able to view your health information using other applications (apps) compatible with our system.

## 2022-10-04 NOTE — PROVIDER CONTACT NOTE (CRITICAL VALUE NOTIFICATION) - ASSESSMENT
Pt asymptomatic. Denies chest pain, SOB, lightheadedness, dizziness.  VS WNL.
Pt states she feels little weak otherwise she is ok;   upon assessment, pt looks little pale, bleeding wnl

## 2022-10-04 NOTE — PROVIDER CONTACT NOTE (CRITICAL VALUE NOTIFICATION) - ACTION/TREATMENT ORDERED:
Pt seen and assessed by Dr. Pauline Whitney at bedside. Pt refuses blood transfusion. Will do CBC tomorrrow morning. Will continue to monitor pt.
Dr. campbell and Dr Elizalde is coming to assess the patient.  1 U PRBCs ordered as per Dr. Elizalde after seeing the patient.

## 2022-10-04 NOTE — CHART NOTE - NSCHARTNOTEFT_GEN_A_CORE
R1 Chart Note  Note delayed due to clinical responsibilities.    Pt seen at bedside due to AM CBC with H/H 6.4/18.9 and platelets 95. Yesterday afternoon, H/H 7.0/20.2. Pt reports generalized fatigue, but denies dizziness, weakness. Reports she has been ambulating on her own. Denies other complaints.     Vital Signs Last 24 Hrs  T(C): 36.6 (04 Oct 2022 09:16), Max: 37 (03 Oct 2022 16:45)  T(F): 97.8 (04 Oct 2022 09:16), Max: 98.6 (03 Oct 2022 16:45)  HR: 82 (04 Oct 2022 09:16) (82 - 98)  BP: 113/72 (04 Oct 2022 09:16) (104/69 - 115/79)  BP(mean): 83 (03 Oct 2022 16:45) (83 - 83)  RR: 16 (04 Oct 2022 09:16) (16 - 18)  SpO2: 99% (04 Oct 2022 09:16) (98% - 99%)    Parameters below as of 04 Oct 2022 09:16  Patient On (Oxygen Delivery Method): room air    A/P: 37y  PPD#1 from  with course notable for gHTN, shoulder dystocia, third degree perineal laceration, and postpartum temp presenting with acute blood loss anemia. .    -Pt agreeable to 1u pRBC  -Will f/u 2h post-transfusion CBC  -Pt may be discharged later today if appropriate rise in Hct noted after transfusion    D/w Dr. Lizette Castro PGY1

## 2022-10-04 NOTE — PROGRESS NOTE ADULT - ASSESSMENT
A/P: 38yo  now PPD#1 s/p , course c/b gHTN and PPT (38 @240a on 10/3).  Patient is stable and doing well post-partum.     #PP  - Pain well controlled, continue Motrin and Tylenol  - Increase ambulation, SCDs when not ambulating  - Continue regular diet  - s/p Invanz x1 for 3* and PPT. AF since.  - WBC: 13.36 -> 25.81 -> 16.89    #gHTN  - Plt 133->102->99, however last CBC likely dilutional as Hct also dropped  - Repeat CBC this AM.  - P/C 0.2  - CTM BPs    Peyton Cm PGY1 A/P: 36yo  now PPD#1 s/p , course c/b gHTN and PPT (38 @240a on 10/3).  Patient is stable and doing well post-partum.     #PP  - Pain well controlled, continue Motrin and Tylenol  - Increase ambulation, SCDs when not ambulating  - Continue regular diet  - Periorbital swelling 2/2 pushing  - s/p Invanz x1 for 3* and PPT. AF since.  - WBC: 13.36 -> 25.81 -> 16.89    #gHTN  - Plt 133->102->99, however last CBC likely dilutional as Hct also dropped  - Repeat CBC this AM.  - P/C 0.2  - CTM BPs    Peyton Jallohs PGY1

## 2022-10-04 NOTE — PROGRESS NOTE ADULT - SUBJECTIVE AND OBJECTIVE BOX
OB Progress Note:  PPD#1    S: Patient feels well. Pain is well controlled. Reports improved swelling in the perineum and around the eyes. She is tolerating a regular diet. She is voiding spontaneously and ambulating without difficulty. Endorses light vaginal bleeding, soaking < 1 pad/hour. Denies CP/SOB. Denies lightheadedness/dizziness. Denies N/V.    MEDICATIONS  (STANDING):  acetaminophen     Tablet .. 975 milliGRAM(s) Oral <User Schedule>  benzocaine 15 mG/menthol 3.6 mG Lozenge 1 Lozenge Oral every 4 hours  dextrose 5% + lactated ringers. 1000 milliLiter(s) (125 mL/Hr) IV Continuous <Continuous>  diphtheria/tetanus/pertussis (acellular) Vaccine (ADAcel) 0.5 milliLiter(s) IntraMuscular once  ertapenem  IVPB 1000 milliGRAM(s) IV Intermittent every 24 hours  ibuprofen  Tablet. 600 milliGRAM(s) Oral every 6 hours  influenza   Vaccine 0.5 milliLiter(s) IntraMuscular once  misoprostol Oral Solution 60 MICROGram(s) Oral every 2 hours  misoprostol Oral Solution 20 MICROGram(s) Oral every 2 hours  oxytocin Infusion 333.333 milliUNIT(s)/Min (1000 mL/Hr) IV Continuous <Continuous>  oxytocin Infusion 333.333 milliUNIT(s)/Min (1000 mL/Hr) IV Continuous <Continuous>  oxytocin Infusion. 4 milliUNIT(s)/Min (4 mL/Hr) IV Continuous <Continuous>  prenatal multivitamin 1 Tablet(s) Oral daily  senna 2 Tablet(s) Oral at bedtime  sodium chloride 0.9% lock flush 3 milliLiter(s) IV Push every 8 hours      MEDICATIONS  (PRN):  benzocaine 20%/menthol 0.5% Spray 1 Spray(s) Topical every 6 hours PRN for Perineal discomfort  dibucaine 1% Ointment 1 Application(s) Topical every 6 hours PRN Perineal discomfort  diphenhydrAMINE 25 milliGRAM(s) Oral every 6 hours PRN Pruritus  hydrocortisone 1% Cream 1 Application(s) Topical every 6 hours PRN Moderate Pain (4-6)  lanolin Ointment 1 Application(s) Topical every 6 hours PRN nipple soreness  magnesium hydroxide Suspension 30 milliLiter(s) Oral two times a day PRN Constipation  oxyCODONE    IR 5 milliGRAM(s) Oral every 3 hours PRN Moderate to Severe Pain (4-10)  pramoxine 1%/zinc 5% Cream 1 Application(s) Topical every 4 hours PRN Moderate Pain (4-6)  simethicone 80 milliGRAM(s) Chew every 4 hours PRN Gas  witch hazel Pads 1 Application(s) Topical every 4 hours PRN Perineal discomfort      O:  Vitals:  Vital Signs Last 24 Hrs  T(C): 36.6 (04 Oct 2022 05:24), Max: 37 (03 Oct 2022 16:45)  T(F): 97.8 (04 Oct 2022 05:24), Max: 98.6 (03 Oct 2022 16:45)  HR: 88 (04 Oct 2022 05:24) (86 - 107)  BP: 108/71 (04 Oct 2022 05:24) (104/69 - 127/83)  BP(mean): 83 (03 Oct 2022 16:45) (83 - 98)  RR: 17 (04 Oct 2022 05:24) (16 - 18)  SpO2: 98% (04 Oct 2022 05:24) (97% - 100%)    Parameters below as of 04 Oct 2022 05:24  Patient On (Oxygen Delivery Method): room air        Labs:  Blood type: A Positive  Rubella IgG: RPR: Negative                          7.0<LL>   16.89<H> >-----------< 99<L>    ( 10-03 @ 14:50 )             20.2<LL>                        7.9<L>   25.81<H> >-----------< 102<L>    ( 10-03 @ 08:24 )             23.1<L>                        13.3   13.36<H> >-----------< 133<L>    ( 10-02 @ 06:24 )             37.3    10-02-22 @ 06:24      135  |  103  |  9   ----------------------------<  129<H>  3.9   |  18<L>  |  0.43<L>        Ca    9.2      02 Oct 2022 06:24    TPro  6.6  /  Alb  3.8  /  TBili  0.1<L>  /  DBili  x   /  AST  12  /  ALT  12  /  AlkPhos  174<H>  10-02-22 @ 06:24          Physical Exam:  General: NAD  Heart: Clinically well-perfused  Lungs: Breathing comfortably on room air  Abdomen: Soft, non-tender, non-distended, fundus firm  Vaginal: Lochia wnl  Extremities: No calf edema/tenderness OB Progress Note:  PPD#1    S: Patient feels well. Pain is well controlled. Reports improved swelling in the perineum and around the eyes. She is tolerating a regular diet. She is voiding spontaneously and ambulating without difficulty. Endorses light vaginal bleeding, soaking < 1 pad/hour. Denies CP/SOB. Denies lightheadedness/dizziness. Denies N/V.    MEDICATIONS  (STANDING):  acetaminophen     Tablet .. 975 milliGRAM(s) Oral <User Schedule>  benzocaine 15 mG/menthol 3.6 mG Lozenge 1 Lozenge Oral every 4 hours  dextrose 5% + lactated ringers. 1000 milliLiter(s) (125 mL/Hr) IV Continuous <Continuous>  diphtheria/tetanus/pertussis (acellular) Vaccine (ADAcel) 0.5 milliLiter(s) IntraMuscular once  ertapenem  IVPB 1000 milliGRAM(s) IV Intermittent every 24 hours  ibuprofen  Tablet. 600 milliGRAM(s) Oral every 6 hours  influenza   Vaccine 0.5 milliLiter(s) IntraMuscular once  misoprostol Oral Solution 60 MICROGram(s) Oral every 2 hours  misoprostol Oral Solution 20 MICROGram(s) Oral every 2 hours  oxytocin Infusion 333.333 milliUNIT(s)/Min (1000 mL/Hr) IV Continuous <Continuous>  oxytocin Infusion 333.333 milliUNIT(s)/Min (1000 mL/Hr) IV Continuous <Continuous>  oxytocin Infusion. 4 milliUNIT(s)/Min (4 mL/Hr) IV Continuous <Continuous>  prenatal multivitamin 1 Tablet(s) Oral daily  senna 2 Tablet(s) Oral at bedtime  sodium chloride 0.9% lock flush 3 milliLiter(s) IV Push every 8 hours      MEDICATIONS  (PRN):  benzocaine 20%/menthol 0.5% Spray 1 Spray(s) Topical every 6 hours PRN for Perineal discomfort  dibucaine 1% Ointment 1 Application(s) Topical every 6 hours PRN Perineal discomfort  diphenhydrAMINE 25 milliGRAM(s) Oral every 6 hours PRN Pruritus  hydrocortisone 1% Cream 1 Application(s) Topical every 6 hours PRN Moderate Pain (4-6)  lanolin Ointment 1 Application(s) Topical every 6 hours PRN nipple soreness  magnesium hydroxide Suspension 30 milliLiter(s) Oral two times a day PRN Constipation  oxyCODONE    IR 5 milliGRAM(s) Oral every 3 hours PRN Moderate to Severe Pain (4-10)  pramoxine 1%/zinc 5% Cream 1 Application(s) Topical every 4 hours PRN Moderate Pain (4-6)  simethicone 80 milliGRAM(s) Chew every 4 hours PRN Gas  witch hazel Pads 1 Application(s) Topical every 4 hours PRN Perineal discomfort      O:  Vitals:  Vital Signs Last 24 Hrs  T(C): 36.6 (04 Oct 2022 05:24), Max: 37 (03 Oct 2022 16:45)  T(F): 97.8 (04 Oct 2022 05:24), Max: 98.6 (03 Oct 2022 16:45)  HR: 88 (04 Oct 2022 05:24) (86 - 107)  BP: 108/71 (04 Oct 2022 05:24) (104/69 - 127/83)  BP(mean): 83 (03 Oct 2022 16:45) (83 - 98)  RR: 17 (04 Oct 2022 05:24) (16 - 18)  SpO2: 98% (04 Oct 2022 05:24) (97% - 100%)    Parameters below as of 04 Oct 2022 05:24  Patient On (Oxygen Delivery Method): room air        Labs:  Blood type: A Positive  Rubella IgG: RPR: Negative                          7.0<LL>   16.89<H> >-----------< 99<L>    ( 10-03 @ 14:50 )             20.2<LL>                        7.9<L>   25.81<H> >-----------< 102<L>    ( 10-03 @ 08:24 )             23.1<L>                        13.3   13.36<H> >-----------< 133<L>    ( 10-02 @ 06:24 )             37.3    10-02-22 @ 06:24      135  |  103  |  9   ----------------------------<  129<H>  3.9   |  18<L>  |  0.43<L>        Ca    9.2      02 Oct 2022 06:24    TPro  6.6  /  Alb  3.8  /  TBili  0.1<L>  /  DBili  x   /  AST  12  /  ALT  12  /  AlkPhos  174<H>  10-02-22 @ 06:24          Physical Exam:  General: NAD  HEENT: Periorbital swelling, erythema  Heart: Clinically well-perfused  Lungs: Breathing comfortably on room air  Abdomen: Soft, non-tender, non-distended, fundus firm  Vaginal: Lochia wnl  Extremities: No calf edema/tenderness

## 2022-10-05 VITALS
HEART RATE: 76 BPM | RESPIRATION RATE: 18 BRPM | DIASTOLIC BLOOD PRESSURE: 79 MMHG | OXYGEN SATURATION: 99 % | TEMPERATURE: 98 F | SYSTOLIC BLOOD PRESSURE: 106 MMHG

## 2022-10-05 PROCEDURE — 87635 SARS-COV-2 COVID-19 AMP PRB: CPT

## 2022-10-05 PROCEDURE — 84550 ASSAY OF BLOOD/URIC ACID: CPT

## 2022-10-05 PROCEDURE — 86850 RBC ANTIBODY SCREEN: CPT

## 2022-10-05 PROCEDURE — 80053 COMPREHEN METABOLIC PANEL: CPT

## 2022-10-05 PROCEDURE — 85025 COMPLETE CBC W/AUTO DIFF WBC: CPT

## 2022-10-05 PROCEDURE — 86769 SARS-COV-2 COVID-19 ANTIBODY: CPT

## 2022-10-05 PROCEDURE — 85610 PROTHROMBIN TIME: CPT

## 2022-10-05 PROCEDURE — G0463: CPT

## 2022-10-05 PROCEDURE — 84156 ASSAY OF PROTEIN URINE: CPT

## 2022-10-05 PROCEDURE — 36430 TRANSFUSION BLD/BLD COMPNT: CPT

## 2022-10-05 PROCEDURE — 59025 FETAL NON-STRESS TEST: CPT

## 2022-10-05 PROCEDURE — P9016: CPT

## 2022-10-05 PROCEDURE — 86900 BLOOD TYPING SEROLOGIC ABO: CPT

## 2022-10-05 PROCEDURE — 82570 ASSAY OF URINE CREATININE: CPT

## 2022-10-05 PROCEDURE — 86780 TREPONEMA PALLIDUM: CPT

## 2022-10-05 PROCEDURE — 85730 THROMBOPLASTIN TIME PARTIAL: CPT

## 2022-10-05 PROCEDURE — 81001 URINALYSIS AUTO W/SCOPE: CPT

## 2022-10-05 PROCEDURE — 36415 COLL VENOUS BLD VENIPUNCTURE: CPT

## 2022-10-05 PROCEDURE — 85027 COMPLETE CBC AUTOMATED: CPT

## 2022-10-05 PROCEDURE — 86923 COMPATIBILITY TEST ELECTRIC: CPT

## 2022-10-05 PROCEDURE — 83615 LACTATE (LD) (LDH) ENZYME: CPT

## 2022-10-05 PROCEDURE — 86901 BLOOD TYPING SEROLOGIC RH(D): CPT

## 2022-10-05 PROCEDURE — 85384 FIBRINOGEN ACTIVITY: CPT

## 2022-10-05 PROCEDURE — 59050 FETAL MONITOR W/REPORT: CPT

## 2022-10-05 RX ADMIN — Medication 600 MILLIGRAM(S): at 09:06

## 2022-10-05 RX ADMIN — Medication 600 MILLIGRAM(S): at 02:32

## 2022-10-05 RX ADMIN — Medication 975 MILLIGRAM(S): at 05:47

## 2022-10-05 RX ADMIN — Medication 600 MILLIGRAM(S): at 03:08

## 2022-10-05 RX ADMIN — Medication 975 MILLIGRAM(S): at 06:16

## 2022-10-05 RX ADMIN — SODIUM CHLORIDE 3 MILLILITER(S): 9 INJECTION INTRAMUSCULAR; INTRAVENOUS; SUBCUTANEOUS at 05:47

## 2022-10-05 RX ADMIN — Medication 600 MILLIGRAM(S): at 10:05

## 2022-10-05 RX ADMIN — Medication 1 TABLET(S): at 12:38

## 2022-10-05 RX ADMIN — Medication 975 MILLIGRAM(S): at 13:30

## 2022-10-05 RX ADMIN — Medication 975 MILLIGRAM(S): at 12:37

## 2022-10-05 RX ADMIN — Medication 975 MILLIGRAM(S): at 00:00

## 2022-10-05 NOTE — PROGRESS NOTE ADULT - SUBJECTIVE AND OBJECTIVE BOX
OB Progress Note:  PPD#2    S: Patient feels well. Pain is well controlled. She is tolerating a regular diet. She is voiding spontaneously and ambulating without difficulty. Endorses light vaginal bleeding, soaking < 1 pad/hour. Denies CP/SOB. Denies lightheadedness/dizziness. Denies N/V.    MEDICATIONS  (STANDING):  acetaminophen     Tablet .. 975 milliGRAM(s) Oral <User Schedule>  benzocaine 15 mG/menthol 3.6 mG Lozenge 1 Lozenge Oral every 4 hours  dextrose 5% + lactated ringers. 1000 milliLiter(s) (125 mL/Hr) IV Continuous <Continuous>  diphtheria/tetanus/pertussis (acellular) Vaccine (ADAcel) 0.5 milliLiter(s) IntraMuscular once  ibuprofen  Tablet. 600 milliGRAM(s) Oral every 6 hours  influenza   Vaccine 0.5 milliLiter(s) IntraMuscular once  misoprostol Oral Solution 60 MICROGram(s) Oral every 2 hours  misoprostol Oral Solution 20 MICROGram(s) Oral every 2 hours  oxytocin Infusion 333.333 milliUNIT(s)/Min (1000 mL/Hr) IV Continuous <Continuous>  oxytocin Infusion 333.333 milliUNIT(s)/Min (1000 mL/Hr) IV Continuous <Continuous>  oxytocin Infusion. 4 milliUNIT(s)/Min (4 mL/Hr) IV Continuous <Continuous>  prenatal multivitamin 1 Tablet(s) Oral daily  senna 2 Tablet(s) Oral at bedtime  sodium chloride 0.9% lock flush 3 milliLiter(s) IV Push every 8 hours      MEDICATIONS  (PRN):  benzocaine 20%/menthol 0.5% Spray 1 Spray(s) Topical every 6 hours PRN for Perineal discomfort  dibucaine 1% Ointment 1 Application(s) Topical every 6 hours PRN Perineal discomfort  diphenhydrAMINE 25 milliGRAM(s) Oral every 6 hours PRN Pruritus  hydrocortisone 1% Cream 1 Application(s) Topical every 6 hours PRN Moderate Pain (4-6)  lanolin Ointment 1 Application(s) Topical every 6 hours PRN nipple soreness  magnesium hydroxide Suspension 30 milliLiter(s) Oral two times a day PRN Constipation  oxyCODONE    IR 5 milliGRAM(s) Oral every 3 hours PRN Moderate to Severe Pain (4-10)  pramoxine 1%/zinc 5% Cream 1 Application(s) Topical every 4 hours PRN Moderate Pain (4-6)  simethicone 80 milliGRAM(s) Chew every 4 hours PRN Gas  witch hazel Pads 1 Application(s) Topical every 4 hours PRN Perineal discomfort      O:  Vitals:   Vital Signs Last 24 Hrs  T(C): 36.7 (05 Oct 2022 05:12), Max: 36.7 (04 Oct 2022 09:50)  T(F): 98.1 (05 Oct 2022 05:12), Max: 98.1 (04 Oct 2022 09:50)  HR: 76 (05 Oct 2022 05:12) (76 - 98)  BP: 106/79 (05 Oct 2022 05:12) (106/79 - 120/76)  BP(mean): --  RR: 18 (05 Oct 2022 05:12) (16 - 18)  SpO2: 99% (05 Oct 2022 05:12) (98% - 99%)    Parameters below as of 05 Oct 2022 05:12  Patient On (Oxygen Delivery Method): room air        Labs:  Blood type: A Positive  Rubella IgG: RPR: Negative                          8.2<L>   13.22<H> >-----------< 115<L>    ( 10-04 @ 16:19 )             23.8<L>                        6.4<LL>   14.58<H> >-----------< 95<L>    ( 10-04 @ 06:32 )             18.9<LL>                        7.0<LL>   16.89<H> >-----------< 99<L>    ( 10-03 @ 14:50 )             20.2<LL>                        7.9<L>   25.81<H> >-----------< 102<L>    ( 10-03 @ 08:24 )             23.1<L>        Physical Exam:  General: NAD  HEENT: Periorbital ecchymosis and edema, decreased from day prior  Heart: Clinically well-perfused  Lungs: Breathing comfortably on room air  Abdomen: Soft, non-tender, non-distended, fundus firm  Vaginal: Lochia wnl  Extremities: No calf edema/tenderness

## 2022-10-05 NOTE — PROGRESS NOTE ADULT - ASSESSMENT
A/P: 36yo  now PPD#2 s/p , course c/b gHTN and PPT (38 @240a on 10/3).  Patient is stable and doing well post-partum.     #PP  - Pain well controlled, continue Motrin and Tylenol  - Increase ambulation, SCDs when not ambulating  - Continue regular diet  - Periorbital swelling 2/2 pushing    #PPT  - s/p Invanz x1  - AF > 24 hr  - WBC: 13.36 -> 25.81 ->> 13.2    #Anemia  - PP acute blood loss anemia  - s/p 1upRBC  - Asymptomatic  - Hct: 37.3 -> 23.1 ->>18.9 -> 1u pRBC -> 23.8    #gHTN  - Plt 133->95->>115 (Plt: 95 presumed dilutional CBC)  - P/C 0.2  - CTM BPs    Peyton Sinks PGY1

## 2022-10-05 NOTE — PROGRESS NOTE ADULT - ATTENDING COMMENTS
Pt evaluated at bedside. She is s/p a vaginal delivery. Pt feeling well without complaints. Denies HA, lightheadedness, CP, SOB, palpitations, abdominal pain, heavy vaginal bleeding.     T(C): 36.7 (10-05-22 @ 05:12), Max: 36.7 (10-05-22 @ 05:12)  HR: 76 (10-05-22 @ 05:12) (76 - 98)  BP: 106/79 (10-05-22 @ 05:12) (106/79 - 120/76)  RR: 18 (10-05-22 @ 05:12) (18 - 18)  SpO2: 99% (10-05-22 @ 05:12) (98% - 99%)    Physical exam:   Abd: Soft, NT, ND, fundus firm and well contracted   VE: Appropriate vaginal bleeding    Plan  -s/p 1uPRBC for PP anemia - appropriate rise in H/H  -Continue routine post partum care  -Agree with above plan    Livan
Agree with assessment and plan. Pt doing well without complaints.   Vitals stable. Exam Benign  - Postpartum anemia due to acute blood loss. Will transfuse 1 U PRBCs  - Routine post partum care

## 2023-10-06 NOTE — OB RN TRIAGE NOTE - NS_FETALMOVEMENT_OBGYN_ALL_OB
ear normal.      Nose: Normal.      Mouth/Throat:     Mouth: Mucous membranes are moist.      Pharynx: Oropharynx is clear. Eyes: Pupils: Pupils are equal, round, and reactive to light. Neck: Normal ROM. Supple. Cardiovascular: Regular rate and rhythm, normal heart sounds, without pathological murmurs, ectopy, gallops, or rubs. LEs without clubbing, edema, or cyanosis. Pulmonary: Respiratory effort normal.  Normal breath sounds. Abdomen: Soft, nontender, normal bowel sounds. Back:  No costovertebral tenderness. Skin:  Normal turgor. Warm, dry, without visible rash, unless noted elsewhere. Musculoskeletal: General: Normal strength / ROM. Neurological:  Oriented. Motor functions intact. Psychiatric:  Pleasant and appropriate. Mood and affect are normal.    Testing:   (All laboratory and radiology results have been personally reviewed by myself)  Labs:  No results found for this visit on 10/06/23. Imaging: All Radiology results interpreted by Radiologist unless otherwise noted. No orders to display       EKG #1:  Interpreted by me unless otherwise noted. Time:  1704    Rate: 80  Rhythm: Sinus  Interpretation: Normal  Comparison: Yes    Assessment / Plan:   The patient's vitals, allergies, medications, and past medical history have been reviewed. John Huerta was seen today for chest pain. Diagnoses and all orders for this visit:    Chest pain, unspecified type  -     EKG 12 Lead        - Disposition: ED    - Educational material printed for patient's review and were included in patient instructions. After Visit Summary was given to patient at the end of visit. - Differential diagnoses were discussed with the patient today. The patient is sent to the ED for further evaluation and treatment. A comprehensive workup is recommended and unable to be performed in an ready care setting. The patient verbalizes understanding and agreed. The patient declined EMS and will go by private vehicle.  The patient
Present, unchanged

## 2023-12-21 ENCOUNTER — APPOINTMENT (OUTPATIENT)
Dept: ULTRASOUND IMAGING | Facility: CLINIC | Age: 38
End: 2023-12-21
Payer: COMMERCIAL

## 2023-12-21 PROCEDURE — 76801 OB US < 14 WKS SINGLE FETUS: CPT

## 2023-12-21 PROCEDURE — 76817 TRANSVAGINAL US OBSTETRIC: CPT

## 2023-12-28 ENCOUNTER — APPOINTMENT (OUTPATIENT)
Dept: ULTRASOUND IMAGING | Facility: CLINIC | Age: 38
End: 2023-12-28
Payer: COMMERCIAL

## 2023-12-28 PROCEDURE — 76817 TRANSVAGINAL US OBSTETRIC: CPT

## 2023-12-28 PROCEDURE — 76801 OB US < 14 WKS SINGLE FETUS: CPT

## 2024-01-04 ENCOUNTER — APPOINTMENT (OUTPATIENT)
Dept: ULTRASOUND IMAGING | Facility: CLINIC | Age: 39
End: 2024-01-04

## 2024-01-12 ENCOUNTER — OUTPATIENT (OUTPATIENT)
Dept: OUTPATIENT SERVICES | Facility: HOSPITAL | Age: 39
LOS: 1 days | End: 2024-01-12
Payer: COMMERCIAL

## 2024-01-12 VITALS
RESPIRATION RATE: 18 BRPM | WEIGHT: 139.99 LBS | OXYGEN SATURATION: 100 % | SYSTOLIC BLOOD PRESSURE: 120 MMHG | TEMPERATURE: 98 F | HEART RATE: 65 BPM | HEIGHT: 61 IN | DIASTOLIC BLOOD PRESSURE: 84 MMHG

## 2024-01-12 DIAGNOSIS — O02.1 MISSED ABORTION: ICD-10-CM

## 2024-01-12 DIAGNOSIS — Z98.890 OTHER SPECIFIED POSTPROCEDURAL STATES: Chronic | ICD-10-CM

## 2024-01-12 DIAGNOSIS — Z01.818 ENCOUNTER FOR OTHER PREPROCEDURAL EXAMINATION: ICD-10-CM

## 2024-01-12 DIAGNOSIS — Z92.89 PERSONAL HISTORY OF OTHER MEDICAL TREATMENT: Chronic | ICD-10-CM

## 2024-01-12 LAB
ANION GAP SERPL CALC-SCNC: 11 MMOL/L — SIGNIFICANT CHANGE UP (ref 5–17)
ANION GAP SERPL CALC-SCNC: 11 MMOL/L — SIGNIFICANT CHANGE UP (ref 5–17)
BLD GP AB SCN SERPL QL: NEGATIVE — SIGNIFICANT CHANGE UP
BLD GP AB SCN SERPL QL: NEGATIVE — SIGNIFICANT CHANGE UP
BUN SERPL-MCNC: 11 MG/DL — SIGNIFICANT CHANGE UP (ref 7–23)
BUN SERPL-MCNC: 11 MG/DL — SIGNIFICANT CHANGE UP (ref 7–23)
CALCIUM SERPL-MCNC: 9 MG/DL — SIGNIFICANT CHANGE UP (ref 8.4–10.5)
CALCIUM SERPL-MCNC: 9 MG/DL — SIGNIFICANT CHANGE UP (ref 8.4–10.5)
CHLORIDE SERPL-SCNC: 105 MMOL/L — SIGNIFICANT CHANGE UP (ref 96–108)
CHLORIDE SERPL-SCNC: 105 MMOL/L — SIGNIFICANT CHANGE UP (ref 96–108)
CO2 SERPL-SCNC: 22 MMOL/L — SIGNIFICANT CHANGE UP (ref 22–31)
CO2 SERPL-SCNC: 22 MMOL/L — SIGNIFICANT CHANGE UP (ref 22–31)
CREAT SERPL-MCNC: 0.53 MG/DL — SIGNIFICANT CHANGE UP (ref 0.5–1.3)
CREAT SERPL-MCNC: 0.53 MG/DL — SIGNIFICANT CHANGE UP (ref 0.5–1.3)
EGFR: 121 ML/MIN/1.73M2 — SIGNIFICANT CHANGE UP
EGFR: 121 ML/MIN/1.73M2 — SIGNIFICANT CHANGE UP
GLUCOSE SERPL-MCNC: 114 MG/DL — HIGH (ref 70–99)
GLUCOSE SERPL-MCNC: 114 MG/DL — HIGH (ref 70–99)
HCT VFR BLD CALC: 38.3 % — SIGNIFICANT CHANGE UP (ref 34.5–45)
HCT VFR BLD CALC: 38.3 % — SIGNIFICANT CHANGE UP (ref 34.5–45)
HGB BLD-MCNC: 13.2 G/DL — SIGNIFICANT CHANGE UP (ref 11.5–15.5)
HGB BLD-MCNC: 13.2 G/DL — SIGNIFICANT CHANGE UP (ref 11.5–15.5)
MCHC RBC-ENTMCNC: 29.8 PG — SIGNIFICANT CHANGE UP (ref 27–34)
MCHC RBC-ENTMCNC: 29.8 PG — SIGNIFICANT CHANGE UP (ref 27–34)
MCHC RBC-ENTMCNC: 34.5 GM/DL — SIGNIFICANT CHANGE UP (ref 32–36)
MCHC RBC-ENTMCNC: 34.5 GM/DL — SIGNIFICANT CHANGE UP (ref 32–36)
MCV RBC AUTO: 86.5 FL — SIGNIFICANT CHANGE UP (ref 80–100)
MCV RBC AUTO: 86.5 FL — SIGNIFICANT CHANGE UP (ref 80–100)
NRBC # BLD: 0 /100 WBCS — SIGNIFICANT CHANGE UP (ref 0–0)
NRBC # BLD: 0 /100 WBCS — SIGNIFICANT CHANGE UP (ref 0–0)
PLATELET # BLD AUTO: 180 K/UL — SIGNIFICANT CHANGE UP (ref 150–400)
PLATELET # BLD AUTO: 180 K/UL — SIGNIFICANT CHANGE UP (ref 150–400)
POTASSIUM SERPL-MCNC: 3.6 MMOL/L — SIGNIFICANT CHANGE UP (ref 3.5–5.3)
POTASSIUM SERPL-MCNC: 3.6 MMOL/L — SIGNIFICANT CHANGE UP (ref 3.5–5.3)
POTASSIUM SERPL-SCNC: 3.6 MMOL/L — SIGNIFICANT CHANGE UP (ref 3.5–5.3)
POTASSIUM SERPL-SCNC: 3.6 MMOL/L — SIGNIFICANT CHANGE UP (ref 3.5–5.3)
RBC # BLD: 4.43 M/UL — SIGNIFICANT CHANGE UP (ref 3.8–5.2)
RBC # BLD: 4.43 M/UL — SIGNIFICANT CHANGE UP (ref 3.8–5.2)
RBC # FLD: 13 % — SIGNIFICANT CHANGE UP (ref 10.3–14.5)
RBC # FLD: 13 % — SIGNIFICANT CHANGE UP (ref 10.3–14.5)
RH IG SCN BLD-IMP: POSITIVE — SIGNIFICANT CHANGE UP
RH IG SCN BLD-IMP: POSITIVE — SIGNIFICANT CHANGE UP
SODIUM SERPL-SCNC: 138 MMOL/L — SIGNIFICANT CHANGE UP (ref 135–145)
SODIUM SERPL-SCNC: 138 MMOL/L — SIGNIFICANT CHANGE UP (ref 135–145)
WBC # BLD: 4.98 K/UL — SIGNIFICANT CHANGE UP (ref 3.8–10.5)
WBC # BLD: 4.98 K/UL — SIGNIFICANT CHANGE UP (ref 3.8–10.5)
WBC # FLD AUTO: 4.98 K/UL — SIGNIFICANT CHANGE UP (ref 3.8–10.5)
WBC # FLD AUTO: 4.98 K/UL — SIGNIFICANT CHANGE UP (ref 3.8–10.5)

## 2024-01-12 PROCEDURE — 86901 BLOOD TYPING SEROLOGIC RH(D): CPT

## 2024-01-12 PROCEDURE — G0463: CPT

## 2024-01-12 PROCEDURE — 85027 COMPLETE CBC AUTOMATED: CPT

## 2024-01-12 PROCEDURE — 86850 RBC ANTIBODY SCREEN: CPT

## 2024-01-12 PROCEDURE — 80048 BASIC METABOLIC PNL TOTAL CA: CPT

## 2024-01-12 PROCEDURE — 86900 BLOOD TYPING SEROLOGIC ABO: CPT

## 2024-01-12 RX ORDER — SODIUM CHLORIDE 9 MG/ML
3 INJECTION INTRAMUSCULAR; INTRAVENOUS; SUBCUTANEOUS EVERY 8 HOURS
Refills: 0 | Status: DISCONTINUED | OUTPATIENT
Start: 2024-01-17 | End: 2024-02-01

## 2024-01-12 RX ORDER — SODIUM CHLORIDE 9 MG/ML
1000 INJECTION, SOLUTION INTRAVENOUS
Refills: 0 | Status: DISCONTINUED | OUTPATIENT
Start: 2024-01-17 | End: 2024-02-01

## 2024-01-12 NOTE — H&P PST ADULT - ASSESSMENT
DASI score: 6.5  DASI activity: takes care of 15 month old, all house chores  Loose teeth or denture: denies

## 2024-01-12 NOTE — H&P PST ADULT - NSICDXFAMILYHX_GEN_ALL_CORE_FT
FAMILY HISTORY:  Father  Still living? Unknown  Family history of peripheral vascular disease, Age at diagnosis: Age Unknown    Mother  Still living? Unknown  FH: breast cancer, Age at diagnosis: Age Unknown

## 2024-01-12 NOTE — H&P PST ADULT - HISTORY OF PRESENT ILLNESS
39 yo female presents to PST prior to scheduled D&C for missed  on 24 with Dr. Carrington Bauer. ; vaginal delivery (required one unit of RBC post delivery, 10/3/2022), ectopic pregnancy. Reports egg transfer was on 23; Ucg levels "were not rising". Denies abdominal cramping, vaginal bleeding.  37 yo female presents to PST prior to scheduled D&C for missed  on 24 with Dr. Carrington Bauer. ; vaginal delivery (required one unit of RBC post delivery, 10/3/2022), ectopic pregnancy. Reports egg transfer was on 23; Ucg levels "were not rising". Denies abdominal cramping, vaginal bleeding.

## 2024-01-16 ENCOUNTER — TRANSCRIPTION ENCOUNTER (OUTPATIENT)
Age: 39
End: 2024-01-16

## 2024-01-17 ENCOUNTER — TRANSCRIPTION ENCOUNTER (OUTPATIENT)
Age: 39
End: 2024-01-17

## 2024-01-17 ENCOUNTER — OUTPATIENT (OUTPATIENT)
Dept: OUTPATIENT SERVICES | Facility: HOSPITAL | Age: 39
LOS: 1 days | End: 2024-01-17
Payer: COMMERCIAL

## 2024-01-17 VITALS
DIASTOLIC BLOOD PRESSURE: 55 MMHG | RESPIRATION RATE: 16 BRPM | OXYGEN SATURATION: 100 % | SYSTOLIC BLOOD PRESSURE: 119 MMHG | HEART RATE: 76 BPM

## 2024-01-17 VITALS
SYSTOLIC BLOOD PRESSURE: 112 MMHG | TEMPERATURE: 98 F | RESPIRATION RATE: 16 BRPM | OXYGEN SATURATION: 100 % | WEIGHT: 139.99 LBS | HEIGHT: 61 IN | HEART RATE: 75 BPM | DIASTOLIC BLOOD PRESSURE: 75 MMHG

## 2024-01-17 DIAGNOSIS — O02.1 MISSED ABORTION: ICD-10-CM

## 2024-01-17 DIAGNOSIS — Z98.890 OTHER SPECIFIED POSTPROCEDURAL STATES: Chronic | ICD-10-CM

## 2024-01-17 DIAGNOSIS — Z92.89 PERSONAL HISTORY OF OTHER MEDICAL TREATMENT: Chronic | ICD-10-CM

## 2024-01-17 PROCEDURE — 86850 RBC ANTIBODY SCREEN: CPT

## 2024-01-17 PROCEDURE — 88305 TISSUE EXAM BY PATHOLOGIST: CPT | Mod: 26

## 2024-01-17 PROCEDURE — 59820 CARE OF MISCARRIAGE: CPT

## 2024-01-17 PROCEDURE — 88305 TISSUE EXAM BY PATHOLOGIST: CPT

## 2024-01-17 PROCEDURE — 86901 BLOOD TYPING SEROLOGIC RH(D): CPT

## 2024-01-17 PROCEDURE — 86900 BLOOD TYPING SEROLOGIC ABO: CPT

## 2024-01-17 RX ORDER — ONDANSETRON 8 MG/1
4 TABLET, FILM COATED ORAL ONCE
Refills: 0 | Status: DISCONTINUED | OUTPATIENT
Start: 2024-01-17 | End: 2024-02-01

## 2024-01-17 RX ORDER — LIDOCAINE HCL 20 MG/ML
0.2 VIAL (ML) INJECTION ONCE
Refills: 0 | Status: COMPLETED | OUTPATIENT
Start: 2024-01-17 | End: 2024-01-17

## 2024-01-17 RX ORDER — OXYCODONE HYDROCHLORIDE 5 MG/1
5 TABLET ORAL ONCE
Refills: 0 | Status: DISCONTINUED | OUTPATIENT
Start: 2024-01-17 | End: 2024-01-17

## 2024-01-17 RX ADMIN — SODIUM CHLORIDE 100 MILLILITER(S): 9 INJECTION, SOLUTION INTRAVENOUS at 13:34

## 2024-01-17 RX ADMIN — SODIUM CHLORIDE 100 MILLILITER(S): 9 INJECTION, SOLUTION INTRAVENOUS at 13:26

## 2024-01-17 RX ADMIN — OXYCODONE HYDROCHLORIDE 5 MILLIGRAM(S): 5 TABLET ORAL at 15:20

## 2024-01-17 RX ADMIN — SODIUM CHLORIDE 3 MILLILITER(S): 9 INJECTION INTRAMUSCULAR; INTRAVENOUS; SUBCUTANEOUS at 13:18

## 2024-01-17 RX ADMIN — OXYCODONE HYDROCHLORIDE 5 MILLIGRAM(S): 5 TABLET ORAL at 14:40

## 2024-01-17 NOTE — ASU DISCHARGE PLAN (ADULT/PEDIATRIC) - ASU DC SPECIAL INSTRUCTIONSFT
Discharge Instructions:  1. Diet: regular  2. Activity limited by:   - no running, heavy lifting, strenuous exercise,   - nothing in vagina (bath, swim, intercourse, douching, tampons) for 2 weeks  3. Medications:   - Ibuprofen 600mg every 6 hours as needed for pain  - Acetaminophen 975mg every 6 hours as needed for pain  4. Follow-up appointment - 4 weeks/2 weeks. Please call the office upon discharge to schedule your appointment if you do not have one already.   5. Precautions:  - Call the office or go to the ED if you have any of the followin) Fever >100.4 that does not resolve  2) Intractable pain  3) Heavy bleeding Discharge Instructions:  1. Diet: regular  2. Activity limited by:   - no running, heavy lifting, strenuous exercise,   - nothing in vagina (bath, swim, intercourse, douching, tampons) for 2 weeks  3. Medications:   - Ibuprofen 600mg every 6 hours as needed for pain  - Acetaminophen 975mg every 6 hours as needed for pain  4. Follow-up appointment - 2 weeks. Please call the office upon discharge to schedule your appointment if you do not have one already.   5. Precautions:  - Call the office or go to the ED if you have any of the followin) Fever >100.4 that does not resolve  2) Intractable pain  3) Heavy bleeding

## 2024-01-17 NOTE — ASU DISCHARGE PLAN (ADULT/PEDIATRIC) - CARE PROVIDER_API CALL
Carrington Bauer  Obstetrics and Gynecology  [address] [place] [address]  Phone: [phone]  Fax: [phone]  Follow Up Time: 2 weeks

## 2024-01-17 NOTE — ASU DISCHARGE PLAN (ADULT/PEDIATRIC) - NURSING INSTRUCTIONS
Next dose of Tylenol will be on or after __8pm_________ ,today/tonight and every 6 hours afterwards for pain management, do not take any Tylenol containing products until this time. Your first dose of Tylenol was given at __2pm_________. Do not exceed more than 4000mg of Tylenol in one 24 hour setting. If no contraindications, you may alternate with Ibuprofen 3 hours after dose of Tylenol. Ibuprofen can be taken every 6 hours. Next dose of ibuprofen can be taken at/after--- 8:20pm-- if needed for pain. First dose of toradol was given at 2:20pm in the OR.

## 2024-01-17 NOTE — BRIEF OPERATIVE NOTE - NSICDXBRIEFPROCEDURE_GEN_ALL_CORE_FT
PROCEDURES:  Dilation and curettage, uterus, using suction, for incomplete  2024 14:38:15  Berenice Weber

## 2024-01-25 LAB — SURGICAL PATHOLOGY STUDY: SIGNIFICANT CHANGE UP

## 2024-04-05 PROBLEM — Z92.89 PERSONAL HISTORY OF OTHER MEDICAL TREATMENT: Chronic | Status: ACTIVE | Noted: 2024-01-12

## 2024-04-05 PROBLEM — O00.90 UNSPECIFIED ECTOPIC PREGNANCY WITHOUT INTRAUTERINE PREGNANCY: Chronic | Status: ACTIVE | Noted: 2024-01-12

## 2024-04-08 ENCOUNTER — APPOINTMENT (OUTPATIENT)
Dept: ULTRASOUND IMAGING | Facility: CLINIC | Age: 39
End: 2024-04-08
Payer: COMMERCIAL

## 2024-04-08 PROCEDURE — 76830 TRANSVAGINAL US NON-OB: CPT

## 2024-04-11 ENCOUNTER — APPOINTMENT (OUTPATIENT)
Dept: ULTRASOUND IMAGING | Facility: CLINIC | Age: 39
End: 2024-04-11
Payer: COMMERCIAL

## 2024-04-11 PROCEDURE — 58340 CATHETER FOR HYSTEROGRAPHY: CPT

## 2024-04-11 PROCEDURE — 76831 ECHO EXAM UTERUS: CPT

## 2024-06-11 ENCOUNTER — APPOINTMENT (OUTPATIENT)
Dept: ULTRASOUND IMAGING | Facility: CLINIC | Age: 39
End: 2024-06-11
Payer: COMMERCIAL

## 2024-06-11 ENCOUNTER — APPOINTMENT (OUTPATIENT)
Dept: ULTRASOUND IMAGING | Facility: CLINIC | Age: 39
End: 2024-06-11

## 2024-06-11 PROCEDURE — 76817 TRANSVAGINAL US OBSTETRIC: CPT

## 2024-06-18 ENCOUNTER — APPOINTMENT (OUTPATIENT)
Dept: ULTRASOUND IMAGING | Facility: CLINIC | Age: 39
End: 2024-06-18

## 2024-10-17 ENCOUNTER — INPATIENT (INPATIENT)
Facility: HOSPITAL | Age: 39
LOS: 0 days | Discharge: HOME | DRG: 951 | End: 2024-10-18
Attending: OBSTETRICS & GYNECOLOGY | Admitting: OBSTETRICS & GYNECOLOGY
Payer: COMMERCIAL

## 2024-10-17 VITALS — OXYGEN SATURATION: 99 %

## 2024-10-17 DIAGNOSIS — O26.899 OTHER SPECIFIED PREGNANCY RELATED CONDITIONS, UNSPECIFIED TRIMESTER: ICD-10-CM

## 2024-10-17 DIAGNOSIS — Z34.80 ENCOUNTER FOR SUPERVISION OF OTHER NORMAL PREGNANCY, UNSPECIFIED TRIMESTER: ICD-10-CM

## 2024-10-17 DIAGNOSIS — Z98.890 OTHER SPECIFIED POSTPROCEDURAL STATES: Chronic | ICD-10-CM

## 2024-10-17 DIAGNOSIS — Z92.89 PERSONAL HISTORY OF OTHER MEDICAL TREATMENT: Chronic | ICD-10-CM

## 2024-10-17 LAB
APTT BLD: 25.3 SEC — SIGNIFICANT CHANGE UP (ref 24.5–35.6)
BLD GP AB SCN SERPL QL: NEGATIVE — SIGNIFICANT CHANGE UP
FIBRINOGEN PPP-MCNC: 293 MG/DL — SIGNIFICANT CHANGE UP (ref 200–445)
HCT VFR BLD CALC: 36.4 % — SIGNIFICANT CHANGE UP (ref 34.5–45)
HGB BLD-MCNC: 12.6 G/DL — SIGNIFICANT CHANGE UP (ref 11.5–15.5)
INR BLD: 0.95 RATIO — SIGNIFICANT CHANGE UP (ref 0.85–1.16)
MCHC RBC-ENTMCNC: 30.2 PG — SIGNIFICANT CHANGE UP (ref 27–34)
MCHC RBC-ENTMCNC: 34.6 GM/DL — SIGNIFICANT CHANGE UP (ref 32–36)
MCV RBC AUTO: 87.3 FL — SIGNIFICANT CHANGE UP (ref 80–100)
NRBC # BLD: 0 /100 WBCS — SIGNIFICANT CHANGE UP (ref 0–0)
PLATELET # BLD AUTO: 130 K/UL — LOW (ref 150–400)
PROTHROM AB SERPL-ACNC: 10.9 SEC — SIGNIFICANT CHANGE UP (ref 9.9–13.4)
RBC # BLD: 4.17 M/UL — SIGNIFICANT CHANGE UP (ref 3.8–5.2)
RBC # FLD: 12.9 % — SIGNIFICANT CHANGE UP (ref 10.3–14.5)
RH IG SCN BLD-IMP: POSITIVE — SIGNIFICANT CHANGE UP
WBC # BLD: 9.85 K/UL — SIGNIFICANT CHANGE UP (ref 3.8–10.5)
WBC # FLD AUTO: 9.85 K/UL — SIGNIFICANT CHANGE UP (ref 3.8–10.5)

## 2024-10-17 RX ORDER — PRENATAL VIT,CAL 76/IRON/FOLIC 29 MG-1 MG
1 TABLET ORAL DAILY
Refills: 0 | Status: DISCONTINUED | OUTPATIENT
Start: 2024-10-17 | End: 2024-10-18

## 2024-10-17 RX ORDER — BETAMETHASONE ACETATE,SOD PHOS 6 MG/ML
12 VIAL (ML) INJECTION EVERY 24 HOURS
Refills: 0 | Status: DISCONTINUED | OUTPATIENT
Start: 2024-10-17 | End: 2024-10-18

## 2024-10-17 RX ORDER — SODIUM CHLORIDE IRRIG SOLUTION 0.9 %
1000 SOLUTION, IRRIGATION IRRIGATION
Refills: 0 | Status: DISCONTINUED | OUTPATIENT
Start: 2024-10-17 | End: 2024-10-18

## 2024-10-17 RX ADMIN — Medication 12 MILLIGRAM(S): at 23:19

## 2024-10-17 NOTE — OB PROVIDER H&P - NS_SONONOTE_OBGYN_ALL_OB_FT
MVP 5.53, good fetal movement and breathing noted  (55%), MVP 5.53, good fetal movement and breathing noted

## 2024-10-17 NOTE — OB PROVIDER H&P - ASSESSMENT
38yo  @24w0d with placenta previa p/f vaginal bleeding. VSS. Physical exam with trickle of bright red blood from os, no pooling. Patient to be admitted for monitoring and  corticosteroids.    #Vaginal bleeding/placenta previa  - BMZ  - Continuous fetal monitoring  - Pad counts    #Maternal wellbeing  - PNV  - T&S q3d    d/w Dr. Lizette Mcnally, PGY3  40yo  @24w0d with placenta previa p/f vaginal bleeding. VSS. Physical exam with trickle of bright red blood from os, no pooling. Patient to be admitted for monitoring and  corticosteroids.    #Vaginal bleeding/placenta previa  - BMZ  - Continuous fetal monitoring  - Pad counts    #Maternal wellbeing  - PNV  - NPO/LR@125  - T&S q3d    d/w Dr. Lizette Mcnally, PGY3

## 2024-10-17 NOTE — OB PROVIDER H&P - NSHPPHYSICALEXAM_GEN_ALL_CORE
Vital Signs Last 24 Hrs  T(C): 36.8 (17 Oct 2024 21:00), Max: 36.8 (17 Oct 2024 20:53)  T(F): 98.2 (17 Oct 2024 21:00), Max: 98.24 (17 Oct 2024 20:53)  HR: 95 (17 Oct 2024 23:27) (89 - 118)  BP: 134/85 (17 Oct 2024 21:00) (134/85 - 134/85)  BP(mean): --  RR: 17 (17 Oct 2024 21:00) (17 - 17)  SpO2: 96% (17 Oct 2024 23:27) (91% - 100%)    Gen: well-appearing, NAD  Abd: soft, nontender, gravid  Speculum: trickle of bright red blood from os, no pooling. Cervix visually closed

## 2024-10-17 NOTE — OB PROVIDER H&P - NSHPLABSRESULTS_GEN_ALL_CORE
12.6   9.85  )-----------( 130      ( 10-17 @ 21:53 )             36.4     PT/INR - ( 17 Oct 2024 21:53 )   PT: 10.9 sec;   INR: 0.95 ratio         PTT - ( 17 Oct 2024 21:53 )  PTT:25.3 sec

## 2024-10-17 NOTE — OB PROVIDER H&P - HISTORY OF PRESENT ILLNESS
40yo  @24w0d with placenta previa p/f vaginal bleeding. Patient reports a gush of bright red blood that saturated a panty liner. She has had continued bright red bleeding since then that saturated 20% of a panty liner over one hour. This is her second bleed. First bleed was at 8wk gestation. -LOF, -Ctx, +FM. Pt denies fever, chills, nausea, vomiting, diarrhea, headache, constipation, dizziness, syncope, chest pain, palpitations, shortness of breath, dysuria, urgency, frequency.    PNC: OB = Petrocelli  - Placenta previa  - AMA  EFW: 14oz at 20wk anatomy scan    ObHx:   -   c/b gHTN, endometritis, postpartum anemia s/p 1u pRBC  -  MAB s/p D&C  - ectopic pregnancy s/p MTX  GynHx: hx abnl pap s/p normal colpo, normal pap since, -c/f/STI  MedHx: denies  SrgHx: D&C x1  PsychHx: denies  SocialHx: denies T/E/D  AllergyHx: denies  RxHx: PNV, ASA 81

## 2024-10-18 ENCOUNTER — APPOINTMENT (OUTPATIENT)
Dept: ANTEPARTUM | Facility: CLINIC | Age: 39
End: 2024-10-18
Payer: COMMERCIAL

## 2024-10-18 ENCOUNTER — ASOB RESULT (OUTPATIENT)
Age: 39
End: 2024-10-18

## 2024-10-18 VITALS
RESPIRATION RATE: 18 BRPM | DIASTOLIC BLOOD PRESSURE: 73 MMHG | HEART RATE: 109 BPM | SYSTOLIC BLOOD PRESSURE: 126 MMHG | OXYGEN SATURATION: 97 % | TEMPERATURE: 99 F

## 2024-10-18 LAB
APTT BLD: 27 SEC — SIGNIFICANT CHANGE UP (ref 24.5–35.6)
BASOPHILS # BLD AUTO: 0.02 K/UL — SIGNIFICANT CHANGE UP (ref 0–0.2)
BASOPHILS NFR BLD AUTO: 0.2 % — SIGNIFICANT CHANGE UP (ref 0–2)
EOSINOPHIL # BLD AUTO: 0 K/UL — SIGNIFICANT CHANGE UP (ref 0–0.5)
EOSINOPHIL NFR BLD AUTO: 0 % — SIGNIFICANT CHANGE UP (ref 0–6)
FIBRINOGEN PPP-MCNC: 282 MG/DL — SIGNIFICANT CHANGE UP (ref 200–445)
HCT VFR BLD CALC: 38.5 % — SIGNIFICANT CHANGE UP (ref 34.5–45)
HGB BLD-MCNC: 13 G/DL — SIGNIFICANT CHANGE UP (ref 11.5–15.5)
IMM GRANULOCYTES NFR BLD AUTO: 0.9 % — SIGNIFICANT CHANGE UP (ref 0–0.9)
INR BLD: 0.97 RATIO — SIGNIFICANT CHANGE UP (ref 0.85–1.16)
LYMPHOCYTES # BLD AUTO: 0.78 K/UL — LOW (ref 1–3.3)
LYMPHOCYTES # BLD AUTO: 8 % — LOW (ref 13–44)
MCHC RBC-ENTMCNC: 30.3 PG — SIGNIFICANT CHANGE UP (ref 27–34)
MCHC RBC-ENTMCNC: 33.8 GM/DL — SIGNIFICANT CHANGE UP (ref 32–36)
MCV RBC AUTO: 89.7 FL — SIGNIFICANT CHANGE UP (ref 80–100)
MONOCYTES # BLD AUTO: 0.23 K/UL — SIGNIFICANT CHANGE UP (ref 0–0.9)
MONOCYTES NFR BLD AUTO: 2.4 % — SIGNIFICANT CHANGE UP (ref 2–14)
NEUTROPHILS # BLD AUTO: 8.59 K/UL — HIGH (ref 1.8–7.4)
NEUTROPHILS NFR BLD AUTO: 88.5 % — HIGH (ref 43–77)
NRBC # BLD: 0 /100 WBCS — SIGNIFICANT CHANGE UP (ref 0–0)
PLATELET # BLD AUTO: 150 K/UL — SIGNIFICANT CHANGE UP (ref 150–400)
PROTHROM AB SERPL-ACNC: 11.1 SEC — SIGNIFICANT CHANGE UP (ref 9.9–13.4)
RBC # BLD: 4.29 M/UL — SIGNIFICANT CHANGE UP (ref 3.8–5.2)
RBC # FLD: 13 % — SIGNIFICANT CHANGE UP (ref 10.3–14.5)
WBC # BLD: 9.71 K/UL — SIGNIFICANT CHANGE UP (ref 3.8–10.5)
WBC # FLD AUTO: 9.71 K/UL — SIGNIFICANT CHANGE UP (ref 3.8–10.5)

## 2024-10-18 PROCEDURE — 76819 FETAL BIOPHYS PROFIL W/O NST: CPT | Mod: 26,59

## 2024-10-18 PROCEDURE — 85027 COMPLETE CBC AUTOMATED: CPT

## 2024-10-18 PROCEDURE — 85610 PROTHROMBIN TIME: CPT

## 2024-10-18 PROCEDURE — 86901 BLOOD TYPING SEROLOGIC RH(D): CPT

## 2024-10-18 PROCEDURE — 85025 COMPLETE CBC W/AUTO DIFF WBC: CPT

## 2024-10-18 PROCEDURE — 87653 STREP B DNA AMP PROBE: CPT

## 2024-10-18 PROCEDURE — 85730 THROMBOPLASTIN TIME PARTIAL: CPT

## 2024-10-18 PROCEDURE — 76816 OB US FOLLOW-UP PER FETUS: CPT | Mod: 26

## 2024-10-18 PROCEDURE — 86900 BLOOD TYPING SEROLOGIC ABO: CPT

## 2024-10-18 PROCEDURE — 85384 FIBRINOGEN ACTIVITY: CPT

## 2024-10-18 PROCEDURE — 86850 RBC ANTIBODY SCREEN: CPT

## 2024-10-18 RX ORDER — PRENATAL VIT,CAL 76/IRON/FOLIC 29 MG-1 MG
1 TABLET ORAL
Qty: 0 | Refills: 0 | DISCHARGE
Start: 2024-10-18

## 2024-10-18 RX ORDER — PRENATAL VIT,CAL 76/IRON/FOLIC 29 MG-1 MG
1 TABLET ORAL DAILY
Refills: 0 | Status: DISCONTINUED | OUTPATIENT
Start: 2024-10-18 | End: 2024-10-18

## 2024-10-18 RX ORDER — INFLUENZA VIRUS VACCINE 15; 15; 15; 15 UG/.5ML; UG/.5ML; UG/.5ML; UG/.5ML
0.5 SUSPENSION INTRAMUSCULAR ONCE
Refills: 0 | Status: DISCONTINUED | OUTPATIENT
Start: 2024-10-18 | End: 2024-10-18

## 2024-10-18 RX ORDER — BETAMETHASONE ACETATE,SOD PHOS 6 MG/ML
12 VIAL (ML) INJECTION EVERY 24 HOURS
Refills: 0 | Status: DISCONTINUED | OUTPATIENT
Start: 2024-10-18 | End: 2024-10-18

## 2024-10-18 RX ORDER — BETAMETHASONE ACETATE,SOD PHOS 6 MG/ML
12 VIAL (ML) INJECTION ONCE
Refills: 0 | Status: COMPLETED | OUTPATIENT
Start: 2024-10-18 | End: 2024-10-18

## 2024-10-18 RX ADMIN — Medication 125 MILLILITER(S): at 00:07

## 2024-10-18 RX ADMIN — Medication 12 MILLIGRAM(S): at 21:48

## 2024-10-18 NOTE — OB RN PATIENT PROFILE - AS SC BRADEN MOBILITY
For information on Fall & Injury Prevention, visit [email or web address] (4) no limitation

## 2024-10-18 NOTE — PROGRESS NOTE ADULT - ASSESSMENT
40yo  @24w1d with placenta previa p/f vaginal bleeding. VSS. Initial physical exam with trickle of bright red blood from os, no pooling. Patient admitted for monitoring and BMZ. Patient s/p x1 dose. Patient with no reported change in vaginal bleeding, so plan is for continued monitoring and MFM scan this morning     #Vaginal bleeding/placenta previa  - AM CBC and coags. Fibrinogen most recently 293  - BMZ x1, 2nd dose is due tonight   - Fetal status reassuring. Will switch to NST BID. Patient appropriate for the floor   - Pad counts    #Maternal wellbeing  - PNV  - Regular diet   - T&S q3d    José Miguel Burgess, PGY-3  Obstetrics & Gynecology     d/w Dr. FUNMILAYO Bauer

## 2024-10-18 NOTE — DISCHARGE NOTE ANTEPARTUM - PATIENT PORTAL LINK FT
You can access the FollowMyHealth Patient Portal offered by [facility] by registering at the following website: [email or web address]. By joining [employer]’s FollowMyHealth portal, you will also be able to view your health information using other applications (apps) compatible with our system.

## 2024-10-18 NOTE — OB RN PATIENT PROFILE - FALL HARM RISK - UNIVERSAL INTERVENTIONS
Bed in lowest position, wheels locked, appropriate side rails in place/Call bell, personal items and telephone in reach/Instruct patient to call for assistance before getting out of bed or chair/Non-slip footwear when patient is out of bed/[place] to call system/Physically safe environment - no spills, clutter or unnecessary equipment/Purposeful Proactive Rounding/Room/bathroom lighting operational, light cord in reach

## 2024-10-18 NOTE — DISCHARGE NOTE ANTEPARTUM - HOSPITAL COURSE
38yo  who was admitted at 24w0d for vaginal bleeding in the setting of a known complete placenta previa. Patient initially with a slow trickle of bleeding that decreased significantly. Patient received x2 doses of Betamethasone and underwent a MFM scan. On day of discharge ***   38yo  who was admitted at 24w0d for vaginal bleeding in the setting of a known complete placenta previa. Patient initially with a slow trickle of bleeding that decreased significantly. Patient received x2 doses of Betamethasone and underwent a MFM scan. On day of discharge patient with scant dark brown spotting over many hours. Patient cleared for discharge with close follow up with Dr. Bauer

## 2024-10-18 NOTE — DISCHARGE NOTE ANTEPARTUM - FINANCIAL ASSISTANCE
[facility] provides services at a reduced cost to those who are determined to be eligible through [facility]’s financial assistance program. Information regarding [facility]’s financial assistance program can be found by going to [email or web address] or by calling [phone].

## 2024-10-18 NOTE — PROGRESS NOTE ADULT - SUBJECTIVE AND OBJECTIVE BOX
R3 Antepartum Note, HD#2     Patient seen and examined at bedside,  Pt reports +FM. Denies any leakage of fluid or contractions. Continues to endorse a faint trickle of VB    Vital Signs Last 24 Hours  T(C): 36.9 (10-18-24 @ 06:35), Max: 37.0 (10-18-24 @ 03:17)  HR: 110 (10-18-24 @ 07:41) (81 - 122)  BP: 123/67 (10-18-24 @ 07:28) (119/65 - 134/85)  RR: 16 (10-18-24 @ 06:35) (16 - 17)  SpO2: 98% (10-18-24 @ 07:41) (91% - 100%)    CAPILLARY BLOOD GLUCOSE          Physical Exam:  General: No acute distress. Resting comfortably  Pulm: Unlabored breathing. No respiratory distress   Abdomen: Soft. Non-tender. Gravid   Ext: No calf tenderness bilaterally        Labs:             12.6   9.85  )-----------( 130      ( 10-17 @ 21:53 )             36.4           PT/INR - ( 10-17 @ 21:53 )   PT: 10.9 sec;   INR: 0.95 ratio    PTT - ( 10-17 @ 21:53 )  PTT:25.3 sec        MEDICATIONS  (STANDING):  betamethasone Injectable 12 milliGRAM(s) IntraMuscular every 24 hours  influenza   Vaccine 0.5 milliLiter(s) IntraMuscular once  lactated ringers. 1000 milliLiter(s) (125 mL/Hr) IV Continuous <Continuous>  prenatal multivitamin 1 Tablet(s) Oral daily    MEDICATIONS  (PRN):

## 2024-10-18 NOTE — DISCHARGE NOTE ANTEPARTUM - CARE PROVIDERS DIRECT ADDRESSES
[email or web address] the [facility]

## 2024-10-18 NOTE — DISCHARGE NOTE ANTEPARTUM - CARE PLAN
1 Principal Discharge DX:	Placenta previa, antepartum  Assessment and plan of treatment:	Please continue to monitor for vaginal bleeding and follow up closely with your obgyn

## 2024-10-18 NOTE — DISCHARGE NOTE ANTEPARTUM - CARE PROVIDER_API CALL
Carrington Bauer  Obstetrics and Gynecology  [address] [place] [address]  Phone: [phone]  Fax: [phone]  Established Patient  Follow Up Time: Routine

## 2024-10-18 NOTE — DISCHARGE NOTE ANTEPARTUM - MEDICATION SUMMARY - MEDICATIONS TO CHANGE
Disp Refills Start End     linaclotide (LINZESS) 145 MCG capsule 90 capsule 1 6/18/2019     Sig - Route: Take 1 capsule by mouth daily. - Oral      Last appointment 6/18/19  Next appointment 9/20/19    Refill authorized per protocol. I will SWITCH the dose or number of times a day I take the medications listed below when I get home from the hospital:  None

## 2024-10-18 NOTE — DISCHARGE NOTE ANTEPARTUM - NS MD DC FALL RISK RISK
For information on Fall & Injury Prevention, visit: [email or web address] OR  [email or web address] OR  https://www.cdc.gov/steadi/patient.html

## 2024-10-19 LAB
GROUP B BETA STREP DNA (PCR): SIGNIFICANT CHANGE UP
SOURCE GROUP B STREP: SIGNIFICANT CHANGE UP

## 2024-12-09 ENCOUNTER — INPATIENT (INPATIENT)
Facility: HOSPITAL | Age: 39
LOS: 2 days | Discharge: HOME | End: 2024-12-12
Attending: OBSTETRICS & GYNECOLOGY | Admitting: OBSTETRICS & GYNECOLOGY
Payer: COMMERCIAL

## 2024-12-09 VITALS — OXYGEN SATURATION: 98 % | DIASTOLIC BLOOD PRESSURE: 90 MMHG | SYSTOLIC BLOOD PRESSURE: 149 MMHG | HEART RATE: 122 BPM

## 2024-12-09 DIAGNOSIS — Z98.890 OTHER SPECIFIED POSTPROCEDURAL STATES: Chronic | ICD-10-CM

## 2024-12-09 DIAGNOSIS — Z34.80 ENCOUNTER FOR SUPERVISION OF OTHER NORMAL PREGNANCY, UNSPECIFIED TRIMESTER: ICD-10-CM

## 2024-12-09 DIAGNOSIS — O26.899 OTHER SPECIFIED PREGNANCY RELATED CONDITIONS, UNSPECIFIED TRIMESTER: ICD-10-CM

## 2024-12-09 DIAGNOSIS — Z92.89 PERSONAL HISTORY OF OTHER MEDICAL TREATMENT: Chronic | ICD-10-CM

## 2024-12-09 LAB
ALBUMIN SERPL ELPH-MCNC: 3.4 G/DL — SIGNIFICANT CHANGE UP (ref 3.3–5)
ALP SERPL-CCNC: 130 U/L — HIGH (ref 40–120)
ALT FLD-CCNC: 14 U/L — SIGNIFICANT CHANGE UP (ref 10–45)
ANION GAP SERPL CALC-SCNC: 14 MMOL/L — SIGNIFICANT CHANGE UP (ref 5–17)
APTT BLD: 24.1 SEC — LOW (ref 24.5–35.6)
APTT BLD: 25 SEC — SIGNIFICANT CHANGE UP (ref 24.5–35.6)
AST SERPL-CCNC: 16 U/L — SIGNIFICANT CHANGE UP (ref 10–40)
BASOPHILS # BLD AUTO: 0.02 K/UL — SIGNIFICANT CHANGE UP (ref 0–0.2)
BASOPHILS NFR BLD AUTO: 0.2 % — SIGNIFICANT CHANGE UP (ref 0–2)
BILIRUB SERPL-MCNC: 0.2 MG/DL — SIGNIFICANT CHANGE UP (ref 0.2–1.2)
BLD GP AB SCN SERPL QL: NEGATIVE — SIGNIFICANT CHANGE UP
BUN SERPL-MCNC: 8 MG/DL — SIGNIFICANT CHANGE UP (ref 7–23)
CALCIUM SERPL-MCNC: 8.9 MG/DL — SIGNIFICANT CHANGE UP (ref 8.4–10.5)
CHLORIDE SERPL-SCNC: 105 MMOL/L — SIGNIFICANT CHANGE UP (ref 96–108)
CO2 SERPL-SCNC: 18 MMOL/L — LOW (ref 22–31)
CREAT SERPL-MCNC: 0.39 MG/DL — LOW (ref 0.5–1.3)
EGFR: 130 ML/MIN/1.73M2 — SIGNIFICANT CHANGE UP
EOSINOPHIL # BLD AUTO: 0.22 K/UL — SIGNIFICANT CHANGE UP (ref 0–0.5)
EOSINOPHIL NFR BLD AUTO: 2.3 % — SIGNIFICANT CHANGE UP (ref 0–6)
FIBRINOGEN PPP-MCNC: 272 MG/DL — SIGNIFICANT CHANGE UP (ref 200–445)
FIBRINOGEN PPP-MCNC: 328 MG/DL — SIGNIFICANT CHANGE UP (ref 200–445)
GAS PNL BLDA: SIGNIFICANT CHANGE UP
GLUCOSE SERPL-MCNC: 106 MG/DL — HIGH (ref 70–99)
HCT VFR BLD CALC: 32 % — LOW (ref 34.5–45)
HCT VFR BLD CALC: 34.4 % — LOW (ref 34.5–45)
HGB BLD-MCNC: 11.3 G/DL — LOW (ref 11.5–15.5)
HGB BLD-MCNC: 11.7 G/DL — SIGNIFICANT CHANGE UP (ref 11.5–15.5)
HIV 1 & 2 AB SERPL IA.RAPID: SIGNIFICANT CHANGE UP
HIV 1 & 2 AB SERPL IA.RAPID: SIGNIFICANT CHANGE UP
IMM GRANULOCYTES NFR BLD AUTO: 1.4 % — HIGH (ref 0–0.9)
INR BLD: 0.93 RATIO — SIGNIFICANT CHANGE UP (ref 0.85–1.16)
INR BLD: 0.93 RATIO — SIGNIFICANT CHANGE UP (ref 0.85–1.16)
LYMPHOCYTES # BLD AUTO: 1.39 K/UL — SIGNIFICANT CHANGE UP (ref 1–3.3)
LYMPHOCYTES # BLD AUTO: 14.6 % — SIGNIFICANT CHANGE UP (ref 13–44)
MCHC RBC-ENTMCNC: 30.2 PG — SIGNIFICANT CHANGE UP (ref 27–34)
MCHC RBC-ENTMCNC: 31.3 PG — SIGNIFICANT CHANGE UP (ref 27–34)
MCHC RBC-ENTMCNC: 34 G/DL — SIGNIFICANT CHANGE UP (ref 32–36)
MCHC RBC-ENTMCNC: 35.3 G/DL — SIGNIFICANT CHANGE UP (ref 32–36)
MCV RBC AUTO: 88.6 FL — SIGNIFICANT CHANGE UP (ref 80–100)
MCV RBC AUTO: 88.9 FL — SIGNIFICANT CHANGE UP (ref 80–100)
MONOCYTES # BLD AUTO: 0.92 K/UL — HIGH (ref 0–0.9)
MONOCYTES NFR BLD AUTO: 9.7 % — SIGNIFICANT CHANGE UP (ref 2–14)
NEUTROPHILS # BLD AUTO: 6.82 K/UL — SIGNIFICANT CHANGE UP (ref 1.8–7.4)
NEUTROPHILS NFR BLD AUTO: 71.8 % — SIGNIFICANT CHANGE UP (ref 43–77)
NRBC # BLD: 0 /100 WBCS — SIGNIFICANT CHANGE UP (ref 0–0)
NRBC # BLD: 0 /100 WBCS — SIGNIFICANT CHANGE UP (ref 0–0)
PLATELET # BLD AUTO: 114 K/UL — LOW (ref 150–400)
PLATELET # BLD AUTO: 124 K/UL — LOW (ref 150–400)
POTASSIUM SERPL-MCNC: 3.6 MMOL/L — SIGNIFICANT CHANGE UP (ref 3.5–5.3)
POTASSIUM SERPL-SCNC: 3.6 MMOL/L — SIGNIFICANT CHANGE UP (ref 3.5–5.3)
PROT SERPL-MCNC: 6 G/DL — SIGNIFICANT CHANGE UP (ref 6–8.3)
PROTHROM AB SERPL-ACNC: 10.6 SEC — SIGNIFICANT CHANGE UP (ref 9.9–13.4)
PROTHROM AB SERPL-ACNC: 10.7 SEC — SIGNIFICANT CHANGE UP (ref 9.9–13.4)
RBC # BLD: 3.61 M/UL — LOW (ref 3.8–5.2)
RBC # BLD: 3.87 M/UL — SIGNIFICANT CHANGE UP (ref 3.8–5.2)
RBC # FLD: 12.8 % — SIGNIFICANT CHANGE UP (ref 10.3–14.5)
RBC # FLD: 13 % — SIGNIFICANT CHANGE UP (ref 10.3–14.5)
RH IG SCN BLD-IMP: POSITIVE — SIGNIFICANT CHANGE UP
SODIUM SERPL-SCNC: 137 MMOL/L — SIGNIFICANT CHANGE UP (ref 135–145)
T PALLIDUM AB TITR SER: NEGATIVE — SIGNIFICANT CHANGE UP
WBC # BLD: 20.15 K/UL — HIGH (ref 3.8–10.5)
WBC # BLD: 9.5 K/UL — SIGNIFICANT CHANGE UP (ref 3.8–10.5)
WBC # FLD AUTO: 20.15 K/UL — HIGH (ref 3.8–10.5)
WBC # FLD AUTO: 9.5 K/UL — SIGNIFICANT CHANGE UP (ref 3.8–10.5)

## 2024-12-09 PROCEDURE — 88307 TISSUE EXAM BY PATHOLOGIST: CPT | Mod: 26

## 2024-12-09 DEVICE — KIT A-LINE 1LUM 20G X 12CM SAFE KIT: Type: IMPLANTABLE DEVICE | Status: FUNCTIONAL

## 2024-12-09 DEVICE — SURGICEL POWDER 3 GRAMS: Type: IMPLANTABLE DEVICE | Status: FUNCTIONAL

## 2024-12-09 RX ORDER — NALOXONE HCL 0.4 MG/ML
0.1 AMPUL (ML) INJECTION
Refills: 0 | Status: DISCONTINUED | OUTPATIENT
Start: 2024-12-09 | End: 2024-12-10

## 2024-12-09 RX ORDER — LANOLIN 72 %
1 OINTMENT (GRAM) TOPICAL EVERY 6 HOURS
Refills: 0 | Status: DISCONTINUED | OUTPATIENT
Start: 2024-12-09 | End: 2024-12-12

## 2024-12-09 RX ORDER — SIMETHICONE 125 MG
80 CAPSULE ORAL EVERY 4 HOURS
Refills: 0 | Status: DISCONTINUED | OUTPATIENT
Start: 2024-12-09 | End: 2024-12-12

## 2024-12-09 RX ORDER — HEPARIN SODIUM,PORCINE 1000/ML
5000 VIAL (ML) INJECTION EVERY 12 HOURS
Refills: 0 | Status: DISCONTINUED | OUTPATIENT
Start: 2024-12-09 | End: 2024-12-12

## 2024-12-09 RX ORDER — IBUPROFEN 200 MG
600 TABLET ORAL EVERY 6 HOURS
Refills: 0 | Status: COMPLETED | OUTPATIENT
Start: 2024-12-09 | End: 2025-11-07

## 2024-12-09 RX ORDER — 0.9 % SODIUM CHLORIDE 0.9 %
1000 INTRAVENOUS SOLUTION INTRAVENOUS
Refills: 0 | Status: DISCONTINUED | OUTPATIENT
Start: 2024-12-09 | End: 2024-12-12

## 2024-12-09 RX ORDER — 0.9 % SODIUM CHLORIDE 0.9 %
1000 INTRAVENOUS SOLUTION INTRAVENOUS
Refills: 0 | Status: DISCONTINUED | OUTPATIENT
Start: 2024-12-09 | End: 2024-12-09

## 2024-12-09 RX ORDER — CHLORHEXIDINE GLUCONATE 1.2 MG/ML
1 RINSE ORAL DAILY
Refills: 0 | Status: DISCONTINUED | OUTPATIENT
Start: 2024-12-09 | End: 2024-12-09

## 2024-12-09 RX ORDER — ONDANSETRON HYDROCHLORIDE 4 MG/1
4 TABLET, FILM COATED ORAL EVERY 6 HOURS
Refills: 0 | Status: DISCONTINUED | OUTPATIENT
Start: 2024-12-09 | End: 2024-12-10

## 2024-12-09 RX ORDER — TETANUS TOXOID, REDUCED DIPHTHERIA TOXOID AND ACELLULAR PERTUSSIS VACCINE, ADSORBED 5; 2.5; 8; 8; 2.5 [IU]/.5ML; [IU]/.5ML; UG/.5ML; UG/.5ML; UG/.5ML
0.5 SUSPENSION INTRAMUSCULAR ONCE
Refills: 0 | Status: DISCONTINUED | OUTPATIENT
Start: 2024-12-09 | End: 2024-12-12

## 2024-12-09 RX ORDER — 0.9 % SODIUM CHLORIDE 0.9 %
1000 INTRAVENOUS SOLUTION INTRAVENOUS ONCE
Refills: 0 | Status: COMPLETED | OUTPATIENT
Start: 2024-12-09 | End: 2024-12-09

## 2024-12-09 RX ORDER — NALOXONE HCL 0.4 MG/ML
0.1 AMPUL (ML) INJECTION
Refills: 0 | Status: DISCONTINUED | OUTPATIENT
Start: 2024-12-09 | End: 2024-12-09

## 2024-12-09 RX ORDER — ACETAMINOPHEN 500MG 500 MG/1
1000 TABLET, COATED ORAL ONCE
Refills: 0 | Status: COMPLETED | OUTPATIENT
Start: 2024-12-09 | End: 2024-12-09

## 2024-12-09 RX ORDER — TRISODIUM CITRATE DIHYDRATE AND CITRIC ACID MONOHYDRATE 500; 334 MG/5ML; MG/5ML
15 SOLUTION ORAL EVERY 6 HOURS
Refills: 0 | Status: DISCONTINUED | OUTPATIENT
Start: 2024-12-09 | End: 2024-12-09

## 2024-12-09 RX ORDER — BETAMETHASONE ACETATE,SOD PHOS 6 MG/ML
12.5 VIAL (ML) INJECTION EVERY 24 HOURS
Refills: 0 | Status: DISCONTINUED | OUTPATIENT
Start: 2024-12-09 | End: 2024-12-09

## 2024-12-09 RX ORDER — NALBUPHINE HYDROCHLORIDE 10 MG/ML
2.5 INJECTION INTRAMUSCULAR; INTRAVENOUS; SUBCUTANEOUS EVERY 6 HOURS
Refills: 0 | Status: DISCONTINUED | OUTPATIENT
Start: 2024-12-09 | End: 2024-12-10

## 2024-12-09 RX ORDER — KETOROLAC TROMETHAMINE 30 MG/ML
30 INJECTION INTRAMUSCULAR; INTRAVENOUS EVERY 6 HOURS
Refills: 0 | Status: DISCONTINUED | OUTPATIENT
Start: 2024-12-09 | End: 2024-12-10

## 2024-12-09 RX ORDER — DIPHENHYDRAMINE HCL 25 MG
25 CAPSULE ORAL EVERY 6 HOURS
Refills: 0 | Status: DISCONTINUED | OUTPATIENT
Start: 2024-12-09 | End: 2024-12-12

## 2024-12-09 RX ORDER — OXYCODONE HYDROCHLORIDE 30 MG/1
5 TABLET ORAL
Refills: 0 | Status: DISCONTINUED | OUTPATIENT
Start: 2024-12-09 | End: 2024-12-12

## 2024-12-09 RX ORDER — METOCLOPRAMIDE HYDROCHLORIDE 10 MG/1
10 TABLET ORAL ONCE
Refills: 0 | Status: COMPLETED | OUTPATIENT
Start: 2024-12-09 | End: 2024-12-09

## 2024-12-09 RX ORDER — ACETAMINOPHEN 500MG 500 MG/1
975 TABLET, COATED ORAL
Refills: 0 | Status: DISCONTINUED | OUTPATIENT
Start: 2024-12-09 | End: 2024-12-12

## 2024-12-09 RX ORDER — OXYCODONE HYDROCHLORIDE 30 MG/1
5 TABLET ORAL ONCE
Refills: 0 | Status: DISCONTINUED | OUTPATIENT
Start: 2024-12-09 | End: 2024-12-12

## 2024-12-09 RX ADMIN — ACETAMINOPHEN 500MG 975 MILLIGRAM(S): 500 TABLET, COATED ORAL at 20:56

## 2024-12-09 RX ADMIN — ACETAMINOPHEN 500MG 400 MILLIGRAM(S): 500 TABLET, COATED ORAL at 15:39

## 2024-12-09 RX ADMIN — Medication 0.2 MILLIGRAM(S): at 14:16

## 2024-12-09 RX ADMIN — Medication 0.2 MILLIGRAM(S): at 22:04

## 2024-12-09 RX ADMIN — Medication 300 GRAM(S): at 06:15

## 2024-12-09 RX ADMIN — Medication 50 GM/HR: at 06:36

## 2024-12-09 RX ADMIN — Medication 42 MILLIUNIT(S)/MIN: at 16:09

## 2024-12-09 RX ADMIN — Medication 1000 MILLILITER(S): at 15:40

## 2024-12-09 RX ADMIN — METOCLOPRAMIDE HYDROCHLORIDE 10 MILLIGRAM(S): 10 TABLET ORAL at 15:52

## 2024-12-09 RX ADMIN — KETOROLAC TROMETHAMINE 30 MILLIGRAM(S): 30 INJECTION INTRAMUSCULAR; INTRAVENOUS at 23:24

## 2024-12-09 RX ADMIN — ACETAMINOPHEN 500MG 1000 MILLIGRAM(S): 500 TABLET, COATED ORAL at 16:11

## 2024-12-09 RX ADMIN — ACETAMINOPHEN 500MG 975 MILLIGRAM(S): 500 TABLET, COATED ORAL at 21:26

## 2024-12-09 RX ADMIN — Medication 5000 UNIT(S): at 23:03

## 2024-12-09 RX ADMIN — Medication 0.2 MILLIGRAM(S): at 18:04

## 2024-12-09 RX ADMIN — KETOROLAC TROMETHAMINE 30 MILLIGRAM(S): 30 INJECTION INTRAMUSCULAR; INTRAVENOUS at 23:03

## 2024-12-09 RX ADMIN — Medication 12.5 MILLIGRAM(S): at 06:20

## 2024-12-09 NOTE — OB PROVIDER H&P - HISTORY OF PRESENT ILLNESS
Subjective  HPI: 39yoF  at 31w4d who presents for heavy bright red vaginal bleeding since 4am. Pt reports over the last 2 hours she has saturated 4 pads. Denies passage of clots. Complicated by complete placenta previa. Hospitalized at 24w for first vaginal bleed. Received BMZ 10/17-10/18 at this time. Pt reports 3-4 additional bleeds that occurred at home that self resolve over a couple of hours. However patient reports that this bleed is heavier than anything she has previously experienced. Denies contractions/ cramping. +FM     ObHx:   -   c/b gHTN, endometritis, postpartum anemia s/p 1u pRBC  -  MAB s/p D&C  - ectopic pregnancy s/p MTX  GynHx: hx abnl pap s/p normal colpo, normal pap since, -c/f/STI  MedHx: denies  SrgHx: D&C x1  PsychHx: denies  SocialHx: denies T/E/D  AllergyHx: denies  RxHx: PNV, ASA 81    Objective  T(C): 36.9 (24 @ 05:45), Max: 36.9 (24 @ 05:45)  HR: 110 (24 @ 06:07) (109 - 122)  BP: 149/90 (24 @ 05:45) (149/90 - 149/90)  RR: 18 (24 @ 05:45) (18 - 18)  SpO2: 99% (24 @ 06:07) (98% - 99%)    – PE:   CV: RRR  Pulm: breathing comfortably on RA  Abd: gravid, nontender  Extr: moving all extremities with ease  Patient with fully saturated pad, placed 45min from the time of evaluation.   Bright red blood on patients thighs.  100cc of clot expressed on placement of speculum. Active bleeding of bright red blood with quick pooling.   – FHT: baseline 150, mod variability, +accels, -decels  – [place]: None   – [place]o: Complete anterior / posterior previa. Transverse position, back down.

## 2024-12-09 NOTE — OB RN PATIENT PROFILE - BP NONINVASIVE DIASTOLIC (MM HG)
1980    Chief Complaint   Patient presents with   Norma Beltran Doctor     previously saw Dr. Bob Espinal - DM  , HTN , Graves Disease , HLD       Pt is a 45 y.o. female who presents for endo issues. PCP Dr. Rehan Escobar. Diabetes - Normal kidney function, and normal microalbumin 19 - may consider starting lisinopril again. No current statin - many family member unable to tolerate - will ask mom what she didn't tolerate. ASCVD is 6% but  in DM patient - the ASCVD score will be >7.5 at age 37 if all other variables are constant. Foot exam today, no neuropathy  Autonomic dysfunction - patient denies  Eye exam due now. On insulin pump, sees Deysi DM educator at DM clinic - HgA1c 6.8 at goal.    She is actively checking FSBS 4-5x daily. She has a consistent KRISTIN phenomenon that is easily seen on her cgm. Abby to follow data and see if nighttime highs continue. Hyperthyroid - now normal - off Tapazole x one year. Enlarged thyroid on exam.  Check thyroid ultrasound. Past Medical History:   Diagnosis Date    Abnormal Pap smear     Diabetes mellitus ([facility] [address]     type I    Graves disease     Hyperlipidemia     Infertility, female     klomid       Past Surgical History:   Procedure Laterality Date    ANTERIOR CRUCIATE LIGAMENT REPAIR       SECTION      [facility]      WISDOM TOOTH EXTRACTION         Current Outpatient Medications   Medication Sig Dispense Refill    lisinopril (ZESTRIL) 2.5 MG tablet 1/2 tablet daily 15 tablet 5    insulin lispro (HUMALOG) 100 UNIT/ML injection cartridge Inject into the skin continuous 12mn 1.55, 3a 1.4, 6a 1.45, 12n 1.55, 5p 1.6. Ratio 12mn 9, 7a 6, 3p 5, sensitivity 12mn 29, 7a 25, 5p 25, 930p 27, , BS , A! 2.45      Cholecalciferol (VITAMIN D3) 5000 units CAPS Take 1 capsule by mouth daily       No current facility-administered medications for this visit.         Allergies   Allergen Reactions    No Known Drug Allergy Social History     Socioeconomic History    Marital status:      Spouse name: Not on file    Number of children: Not on file    Years of education: Not on file    Highest education level: Not on file   Occupational History    Occupation: Housewife   Social Needs    Financial resource strain: Not on file    Food insecurity:     Worry: Not on file     Inability: Not on file    Transportation needs:     Medical: Not on file     Non-medical: Not on file   Tobacco Use    Smoking status: Former Smoker     Packs/day: 0.30     Years: 2.00     Pack years: 0.60     Last attempt to quit: 2002     Years since quittin.2    Smokeless tobacco: Never Used   Substance and Sexual Activity    Alcohol use:  Yes     Alcohol/week: 0.5 oz     Types: 1 drink(s) per week     Comment: Occasionally    Drug use: No    Sexual activity: Yes     Partners: Male   Lifestyle    Physical activity:     Days per week: Not on file     Minutes per session: Not on file    Stress: Not on file   Relationships    Social connections:     Talks on phone: Not on file     Gets together: Not on file     Attends Ep[employer] service: Not on file     Active member of club or organization: Not on file     Attends meetings of clubs or organizations: Not on file     Relationship status: Not on file    Intimate partner violence:     Fear of current or ex partner: Not on file     Emotionally abused: Not on file     Physically abused: Not on file     Forced sexual activity: Not on file   Other Topics Concern    Not on file   Social History Narrative    ** Merged History Encounter **            Family History   Problem Relation Age of Onset    Heart Disease Mother 46        5 bypass CABBG    High Blood Pressure Mother         FMD    High Cholesterol Mother     Other Mother         Heart stent    Thyroid Disease Father         Hypothyroidism    Other Father         AAA    Stroke Maternal Grandmother 66    Diabetes Maternal Grandmother     Cancer Maternal Grandmother 79        Prostate    Hearing Loss Maternal Grandmother     Cancer Maternal Grandfather 80        Prostate Cancer    Miscarriages / Stillbirths Sister        Review of Systems - General ROS: negative for - chills or fever  Psychological ROS: negative for - anxiety and depression  Hematological and Lymphatic ROS: No history of blood clots or bleeding disorder. Respiratory ROS: no cough, shortness of breath, or wheezing  Cardiovascular ROS: no chest pain or dyspnea on exertion, tolerates a flight of stairs, vacuuming  Gastrointestinal ROS: no abdominal pain, change in bowel habits, or black or bloody stools  Genito-Urinary ROS: no dysuria, trouble voiding, or hematuria  Musculoskeletal ROS: negative for - muscle pain or muscular weakness  Neurological ROS: negative for - headaches, numbness/tingling, seizures or weakness  Dermatological ROS: negative for - rash or skin lesion changes    Blood pressure 124/84, pulse 68, height 5' 10\" (1.778 m), weight 235 lb (106.6 kg), not currently breastfeeding. Physical Examination: General appearance -alert, well appearing, and in no distress  Mental status - alert, oriented to person, place, and time  Head - atraumatic, normocephalic  Eyes - pupils equal and reactive to light, extraocular muscles intact  Ears - external ears are normal, hearing is grossly normal  Mouth - oral pharynx clear, mucous membranes moist  Neck - supple, no significant adenopathy, mild thyromegaly  Chest - clear to auscultation, no wheezes, rales or rhonchi, symmetric air entry  Heart - normal rate, regular rhythm, normal S1, S2, no murmurs, rubs, clicks or gallops  Abdomen -soft, nontender, nondistended  Neurological - alert, oriented, cranial nerves II-XII intact, motor and sensation are grossly intact bilateral upper and lower extremities.   Extremities - peripheral pulses normal, no pedal edema, no clubbing or cyanosis  Skin - warm and dry    Foot exam  2/19/19: No foot lesions, normal sensation with monofilament testing bilaterally. +2 DP and PT pulses bilaterally. Diagnostic data:   I have reviewed recent diagnostic testing including labs with patient. Assessment and Plan:      Diagnosis Orders   1. Type 1 diabetes mellitus without complication (HCC)  HM DIABETES FOOT EXAM    lisinopril (ZESTRIL) 2.5 MG tablet   2. Graves disease  US THYROID   3. Thyromegaly  US THYROID     DM - at goal.  Reminded her to get eye exam.  Foot exam done today. Abby to monitor downloads of CGM and suggest pump changes. Add lisinopril and monitor BP. Graves disease/thyromegaly - check ultrasound, normal TSH, FT4 and FT3 2/2019. LDL elevated but ASCVD score <7.5 and patient doesn't want to start statin. Monitor for now. Follow up visit in 6 months. To see DM clinic and they can check BP in 5/2019. Nathalia Parikh MD    [address] [facility]  [address] [place] [address]  [address]  [address]  Dept: [phone]  Dept Fax: [phone] : [phone]

## 2024-12-09 NOTE — OB RN DELIVERY SUMMARY - NSSELHIDDEN_OBGYN_ALL_OB_FT
[NS_DeliveryAttending1_OBGYN_ALL_OB_FT:[id number]==],[NS_DeliveryAttending2_OBGYN_ALL_OB_FT:[id number]=],[NS_DeliveryAssist1_OBGYN_ALL_OB_FT:[email or web address]=],[NS_DeliveryRN_OBGYN_ALL_OB_FT:[email or web address]=]

## 2024-12-09 NOTE — OB RN INTRAOPERATIVE NOTE - NSSELHIDDEN_OBGYN_ALL_OB_FT
[NS_DeliveryAttending1_OBGYN_ALL_OB_FT:[id number]==],[NS_DeliveryAttending2_OBGYN_ALL_OB_FT:[id number]=],[NS_DeliveryAssist1_OBGYN_ALL_OB_FT:[email or web address]=],[NS_DeliveryRN_OBGYN_ALL_OB_FT:[email or web address]=]

## 2024-12-09 NOTE — OB PROVIDER H&P - ASSESSMENT
Assessment: Pt with placenta previa, actively bleeding. For C/S     Plan  Admit to L&D. Routine Labs. IVF.  Complete placental previa.   2 IV placed. 2u placed on hold   STAT CBC/ Coags sent   Anesthesia, NICU, and OB saftey officers made aware  s/p BMZ (10/17- 10/18). 2nd course of BMZ administered   Magnesium gtt started for neuroprotection.  Fetus: Continuous EFM.   Prenatal issues: GBS negative     Patient discussed with attending physician, Dr. Lizette Clarke, PGY-4

## 2024-12-09 NOTE — CHART NOTE - NSCHARTNOTEFT_GEN_A_CORE
Pt presented w/ a complete previa and vaginal bleeding. Initial evaluation speculum placed and pt filled the speculum with blood. Pt continued to have large clots and was mildly tachycardic to 120s.  FHR Cat 1. Initial CBC Hct 34. Pt was evaluated by residents & Dr. Bauer who recommended emergent c/s for the significant bleeding. The patient refused immediate c/s and wanted to monitor. Pt extensively counseled on risks of waiting. After approximately 1+hrs of counseling the pt agreed to primary c/s. I spoke with the patient again discussing the risks of waiting that it could pose to the fetus and the pt herself. Team notified and case scheduled for 9am in the main OR. When in the OR, pt had continued bleeding and pad was saturated with large clots.     ryan

## 2024-12-09 NOTE — OB PROVIDER DELIVERY SUMMARY - NSPROVIDERDELIVERYNOTE_OBGYN_ALL_OB_FT
pLTCS @31w4d for bleeding complete placenta previa.  1g IV TXA given prior to skin incision.   Viable male infant, apgars 6/6, transverse back down delivered vertex presentation.   Grossly normal uterus, tubes, ovaries.    Hysterotomy closed in a single layer with caprosyn.  IM methergine, extra 10u pitocin given for uterine atony with improvement.   Surgicell powder placed along hysterotomy and rectus mucles.  Peritoneum and rectus muscles reapproximated.    Good hemostasis noted.   1000mcg misoprostol given rectally at the completion of the case for passage of clots, mild uterine atony.     EBL: 1000 + ~500cc prior to OR   IVF: 2500  PRBC: 1unit  UOP: 1350    Dictation# pLTCS @31w4d for bleeding complete placenta previa.  1g IV TXA given prior to skin incision.   Viable male infant, apgars 6/6, transverse back down delivered vertex presentation.   Grossly normal uterus, tubes, ovaries.    Hysterotomy closed in a single layer with caprosyn.  IM methergine, extra 10u pitocin given for uterine atony with improvement.   Surgicell powder placed along hysterotomy and rectus mucles.  Peritoneum and rectus muscles reapproximated.    Good hemostasis noted.   1000mcg misoprostol given rectally at the completion of the case for passage of clots, mild uterine atony.     EBL: 1000 + ~500cc prior to OR   IVF: 2500  PRBC: 1unit  UOP: 1350    Dictation#[id number]

## 2024-12-09 NOTE — OB PROVIDER H&P - NSLOWDOSEASPIRINGESTATIONALAGERANGE_OBGYN_ALL_OB
----- Message from Rima Rocha MD sent at 2/4/2019  5:13 PM CST -----  Please call patient and convey results: The ACE levels were elevated which probably indicates sarcoidosis. I will never make the diagnosis of sarcoidosis just based on the blood test and a biopsy either this usually needed. During the hospital stay she was very apprehensive and she flat out refused to undergo for a bronchoscopy and biopsies. If this is something that she is interested in the future I will be more than happy to address. No treatment will be offered because I don't know what I am dealing with for certain. 12-16 weeks

## 2024-12-09 NOTE — OB PROVIDER DELIVERY SUMMARY - NSSELHIDDEN_OBGYN_ALL_OB_FT
[NS_DeliveryAttending1_OBGYN_ALL_OB_FT:[id number]==],[NS_DeliveryAttending2_OBGYN_ALL_OB_FT:[id number]=],[NS_DeliveryAssist1_OBGYN_ALL_OB_FT:[email or web address]=],[NS_DeliveryRN_OBGYN_ALL_OB_FT:[email or web address]=]

## 2024-12-09 NOTE — OB NEONATOLOGY/PEDIATRICIAN DELIVERY SUMMARY - NSPEDSNEONOTESA_OBGYN_ALL_OB_FT
Requested by OB to attend  for  delivery and complete placenta previa. 31.4 week old infant born to a 38 y/o  mother. Maternal labs: Blood Type: Hep B negative, Hep C negative, Rubella Immune, RPR Negative, HIV negative from first trimester (OB to draw HIV for third trimester), GBS Negative (10/18)- no rupture, no labor. Maternal PMHX and PSHx includes: ectopic pregnancy,  complicated by postpartum hemorrhage, and miscarriage x 1(D and C). Prenatal course complicated by complete placenta previa. Previously hospitalized at 24 weeks for bleeding. Betamethasone 10/17-10/18 and . AROM at delivery, bloody. Baby emerged cephalic and through the placenta. Infant pale with mild tone. Delayed Cord Clamping done x 20 seconds. Infant with cry prior to coming to warmer. Baby brought to warmer and was dried, warmed, suctioned, and stimulated. Hat was placed. Started on CPAP 5 immediately. Infant given PPV (24/6) intermittently for 10 minutes due to poor chest rise and apnea. PIP increased to 24 and PEEP increased to 6. At 10 minutes of life, infant still with intermittent apnea and poor chest rise. At 11 minutes of life, infant intubated with 3.0 ETT. CO2 detectors showed color change and breath sounds auscultated b/l. Infant admitted to NICU. Mom wants to feed breastmilk. Mom consents to Hep B. Desires Circumcision. EOS N/A. Requested by OB to attend  for  delivery and complete placenta previa. 31.4 week old infant born to a 40 y/o  mother. Maternal labs: Blood Type: A positive. Hep B negative, Hep C negative, Rubella Immune, RPR Negative, HIV negative from first trimester (OB to draw HIV for third trimester), GBS Negative (10/18)- no rupture, no labor. Maternal PMHX and PSHx includes: ectopic pregnancy,  complicated by postpartum hemorrhage, and miscarriage x 1(D and C). Prenatal course complicated by complete placenta previa. Previously hospitalized at 24 weeks for bleeding. Betamethasone 10/17-10/18 and . AROM at delivery, bloody. Baby emerged cephalic and through the placenta. Infant pale with mild tone. Delayed Cord Clamping done x 20 seconds. Infant with cry prior to coming to warmer. Baby brought to warmer and was dried. Placed into poncho and on the heated mattress. Hat was placed. Started on CPAP 5 immediately. Infant given PPV (24/6) intermittently for 10 minutes due to poor chest rise and apnea. At 10 minutes of life, infant still with intermittent apnea and poor chest rise. At 11 minutes of life, infant intubated with 3.0 ETT at 7.75 cm. CO2 detector showed color change and breath sounds auscultated b/l. Infant admitted to NICU. Mom wants to feed breastmilk. Mom consents to Hep B. Desires Circumcision. Apgars 6/6/7. EOS N/A. Requested by OB to attend  for  delivery and complete placenta previa. 31.4 week old infant born to a 40 y/o  mother. Maternal labs: Blood Type: A positive. Hep B negative, Hep C negative, Rubella Immune, RPR Negative, HIV negative from first trimester (OB to draw HIV for third trimester), GBS Negative (10/18)- no rupture, no labor. Maternal PMHX and PSHx includes: ectopic pregnancy,  complicated by postpartum hemorrhage, and miscarriage x 1(D and C). Prenatal course complicated by complete placenta previa. Previously hospitalized at 24 weeks for bleeding. Betamethasone 10/17-10/18 and . Mom on magnesium prior to delivery. AROM at delivery, bloody. Baby emerged cephalic and through the placenta. Infant pale with mild tone. Delayed Cord Clamping done x 20 seconds. Infant with cry prior to coming to warmer. Baby brought to warmer and was dried. Placed into poncho and on the heated mattress. Infant stimulated and suctioned. Pulse ox placed. Hat was placed. Started on CPAP 5 by 1 minute of life. Infant given PPV (24/6) intermittently for 10 minutes due to poor chest rise and apnea. At 10 minutes of life, infant still with intermittent apnea and poor chest rise. At 11 minutes of life, infant intubated with 3.0 ETT at 7.75 cm. CO2 detector showed color change and breath sounds auscultated b/l. Highest FiO2 60%. Infant admitted to NICU. Mom wants to feed breastmilk. Mom consents to Hep B. Desires Circumcision. Apgars 6/6/7. EOS N/A.  Dr. Tan present at delivery.

## 2024-12-10 LAB
ANISOCYTOSIS BLD QL: SLIGHT — SIGNIFICANT CHANGE UP
BASOPHILS # BLD AUTO: 0 K/UL — SIGNIFICANT CHANGE UP (ref 0–0.2)
BASOPHILS NFR BLD AUTO: 0 % — SIGNIFICANT CHANGE UP (ref 0–2)
DACRYOCYTES BLD QL SMEAR: SLIGHT — SIGNIFICANT CHANGE UP
EOSINOPHIL # BLD AUTO: 0 K/UL — SIGNIFICANT CHANGE UP (ref 0–0.5)
EOSINOPHIL NFR BLD AUTO: 0 % — SIGNIFICANT CHANGE UP (ref 0–6)
HCT VFR BLD CALC: 29 % — LOW (ref 34.5–45)
HGB BLD-MCNC: 9.9 G/DL — LOW (ref 11.5–15.5)
KLEIHAUER-BETKE CALCULATION: 1.4 % — CRITICAL HIGH (ref 0–0.2)
LYMPHOCYTES # BLD AUTO: 0.9 K/UL — LOW (ref 1–3.3)
LYMPHOCYTES # BLD AUTO: 5.2 % — LOW (ref 13–44)
MACROCYTES BLD QL: SLIGHT — SIGNIFICANT CHANGE UP
MANUAL SMEAR VERIFICATION: SIGNIFICANT CHANGE UP
MCHC RBC-ENTMCNC: 31 PG — SIGNIFICANT CHANGE UP (ref 27–34)
MCHC RBC-ENTMCNC: 34.1 G/DL — SIGNIFICANT CHANGE UP (ref 32–36)
MCV RBC AUTO: 90.9 FL — SIGNIFICANT CHANGE UP (ref 80–100)
MONOCYTES # BLD AUTO: 1.5 K/UL — HIGH (ref 0–0.9)
MONOCYTES NFR BLD AUTO: 8.7 % — SIGNIFICANT CHANGE UP (ref 2–14)
MYELOCYTES NFR BLD: 0.9 % — HIGH (ref 0–0)
NEUTROPHILS # BLD AUTO: 14.7 K/UL — HIGH (ref 1.8–7.4)
NEUTROPHILS NFR BLD AUTO: 85.2 % — HIGH (ref 43–77)
OVALOCYTES BLD QL SMEAR: SLIGHT — SIGNIFICANT CHANGE UP
PLAT MORPH BLD: NORMAL — SIGNIFICANT CHANGE UP
PLATELET # BLD AUTO: 122 K/UL — LOW (ref 150–400)
POIKILOCYTOSIS BLD QL AUTO: SLIGHT — SIGNIFICANT CHANGE UP
RBC # BLD: 3.19 M/UL — LOW (ref 3.8–5.2)
RBC # FLD: 13.3 % — SIGNIFICANT CHANGE UP (ref 10.3–14.5)
RBC BLD AUTO: ABNORMAL
WBC # BLD: 17.25 K/UL — HIGH (ref 3.8–10.5)
WBC # FLD AUTO: 17.25 K/UL — HIGH (ref 3.8–10.5)

## 2024-12-10 RX ORDER — IBUPROFEN 200 MG
600 TABLET ORAL EVERY 6 HOURS
Refills: 0 | Status: DISCONTINUED | OUTPATIENT
Start: 2024-12-10 | End: 2024-12-12

## 2024-12-10 RX ADMIN — ACETAMINOPHEN 500MG 975 MILLIGRAM(S): 500 TABLET, COATED ORAL at 02:34

## 2024-12-10 RX ADMIN — ACETAMINOPHEN 500MG 975 MILLIGRAM(S): 500 TABLET, COATED ORAL at 00:00

## 2024-12-10 RX ADMIN — ACETAMINOPHEN 500MG 975 MILLIGRAM(S): 500 TABLET, COATED ORAL at 02:04

## 2024-12-10 RX ADMIN — ACETAMINOPHEN 500MG 975 MILLIGRAM(S): 500 TABLET, COATED ORAL at 20:36

## 2024-12-10 RX ADMIN — ACETAMINOPHEN 500MG 975 MILLIGRAM(S): 500 TABLET, COATED ORAL at 15:46

## 2024-12-10 RX ADMIN — Medication 0.2 MILLIGRAM(S): at 02:04

## 2024-12-10 RX ADMIN — Medication 5000 UNIT(S): at 11:23

## 2024-12-10 RX ADMIN — Medication 5000 UNIT(S): at 23:28

## 2024-12-10 RX ADMIN — KETOROLAC TROMETHAMINE 30 MILLIGRAM(S): 30 INJECTION INTRAMUSCULAR; INTRAVENOUS at 17:20

## 2024-12-10 RX ADMIN — KETOROLAC TROMETHAMINE 30 MILLIGRAM(S): 30 INJECTION INTRAMUSCULAR; INTRAVENOUS at 06:09

## 2024-12-10 RX ADMIN — KETOROLAC TROMETHAMINE 30 MILLIGRAM(S): 30 INJECTION INTRAMUSCULAR; INTRAVENOUS at 06:28

## 2024-12-10 RX ADMIN — KETOROLAC TROMETHAMINE 30 MILLIGRAM(S): 30 INJECTION INTRAMUSCULAR; INTRAVENOUS at 11:23

## 2024-12-10 RX ADMIN — Medication 0.2 MILLIGRAM(S): at 06:09

## 2024-12-10 RX ADMIN — Medication 600 MILLIGRAM(S): at 23:28

## 2024-12-10 RX ADMIN — ACETAMINOPHEN 500MG 975 MILLIGRAM(S): 500 TABLET, COATED ORAL at 08:33

## 2024-12-10 NOTE — PROVIDER CONTACT NOTE (CRITICAL VALUE NOTIFICATION) - ACTION/TREATMENT ORDERED:
MD states no action needed for Mom. NICU fellow also notified of result in case any action needed to be taken for baby.

## 2024-12-11 RX ADMIN — Medication 600 MILLIGRAM(S): at 12:59

## 2024-12-11 RX ADMIN — ACETAMINOPHEN 500MG 975 MILLIGRAM(S): 500 TABLET, COATED ORAL at 20:53

## 2024-12-11 RX ADMIN — Medication 5000 UNIT(S): at 23:52

## 2024-12-11 RX ADMIN — Medication 600 MILLIGRAM(S): at 23:52

## 2024-12-11 RX ADMIN — Medication 600 MILLIGRAM(S): at 05:34

## 2024-12-11 RX ADMIN — ACETAMINOPHEN 500MG 975 MILLIGRAM(S): 500 TABLET, COATED ORAL at 08:26

## 2024-12-11 RX ADMIN — Medication 600 MILLIGRAM(S): at 18:55

## 2024-12-11 RX ADMIN — Medication 600 MILLIGRAM(S): at 18:04

## 2024-12-11 RX ADMIN — ACETAMINOPHEN 500MG 975 MILLIGRAM(S): 500 TABLET, COATED ORAL at 16:40

## 2024-12-11 RX ADMIN — ACETAMINOPHEN 500MG 975 MILLIGRAM(S): 500 TABLET, COATED ORAL at 02:16

## 2024-12-11 RX ADMIN — ACETAMINOPHEN 500MG 975 MILLIGRAM(S): 500 TABLET, COATED ORAL at 02:46

## 2024-12-11 RX ADMIN — Medication 600 MILLIGRAM(S): at 00:00

## 2024-12-11 RX ADMIN — Medication 600 MILLIGRAM(S): at 12:29

## 2024-12-11 RX ADMIN — ACETAMINOPHEN 500MG 975 MILLIGRAM(S): 500 TABLET, COATED ORAL at 22:48

## 2024-12-11 RX ADMIN — ACETAMINOPHEN 500MG 975 MILLIGRAM(S): 500 TABLET, COATED ORAL at 15:05

## 2024-12-11 RX ADMIN — ACETAMINOPHEN 500MG 975 MILLIGRAM(S): 500 TABLET, COATED ORAL at 09:05

## 2024-12-11 RX ADMIN — Medication 5000 UNIT(S): at 12:29

## 2024-12-12 ENCOUNTER — TRANSCRIPTION ENCOUNTER (OUTPATIENT)
Age: 39
End: 2024-12-12

## 2024-12-12 VITALS
DIASTOLIC BLOOD PRESSURE: 84 MMHG | OXYGEN SATURATION: 100 % | HEART RATE: 82 BPM | TEMPERATURE: 98 F | SYSTOLIC BLOOD PRESSURE: 126 MMHG | RESPIRATION RATE: 18 BRPM

## 2024-12-12 PROCEDURE — 85460 HEMOGLOBIN FETAL: CPT

## 2024-12-12 PROCEDURE — 85730 THROMBOPLASTIN TIME PARTIAL: CPT

## 2024-12-12 PROCEDURE — P9016: CPT

## 2024-12-12 PROCEDURE — 86850 RBC ANTIBODY SCREEN: CPT

## 2024-12-12 PROCEDURE — 85014 HEMATOCRIT: CPT

## 2024-12-12 PROCEDURE — 84132 ASSAY OF SERUM POTASSIUM: CPT

## 2024-12-12 PROCEDURE — 85027 COMPLETE CBC AUTOMATED: CPT

## 2024-12-12 PROCEDURE — 86703 HIV-1/HIV-2 1 RESULT ANTBDY: CPT

## 2024-12-12 PROCEDURE — 85610 PROTHROMBIN TIME: CPT

## 2024-12-12 PROCEDURE — 86780 TREPONEMA PALLIDUM: CPT

## 2024-12-12 PROCEDURE — 36415 COLL VENOUS BLD VENIPUNCTURE: CPT

## 2024-12-12 PROCEDURE — 83605 ASSAY OF LACTIC ACID: CPT

## 2024-12-12 PROCEDURE — 84295 ASSAY OF SERUM SODIUM: CPT

## 2024-12-12 PROCEDURE — C1769: CPT

## 2024-12-12 PROCEDURE — 86900 BLOOD TYPING SEROLOGIC ABO: CPT

## 2024-12-12 PROCEDURE — 86923 COMPATIBILITY TEST ELECTRIC: CPT

## 2024-12-12 PROCEDURE — 59050 FETAL MONITOR W/REPORT: CPT

## 2024-12-12 PROCEDURE — 82330 ASSAY OF CALCIUM: CPT

## 2024-12-12 PROCEDURE — 85384 FIBRINOGEN ACTIVITY: CPT

## 2024-12-12 PROCEDURE — 88307 TISSUE EXAM BY PATHOLOGIST: CPT

## 2024-12-12 PROCEDURE — 85018 HEMOGLOBIN: CPT

## 2024-12-12 PROCEDURE — 85025 COMPLETE CBC W/AUTO DIFF WBC: CPT

## 2024-12-12 PROCEDURE — 59025 FETAL NON-STRESS TEST: CPT

## 2024-12-12 PROCEDURE — 82435 ASSAY OF BLOOD CHLORIDE: CPT

## 2024-12-12 PROCEDURE — 36430 TRANSFUSION BLD/BLD COMPNT: CPT

## 2024-12-12 PROCEDURE — 82803 BLOOD GASES ANY COMBINATION: CPT

## 2024-12-12 PROCEDURE — C1889: CPT

## 2024-12-12 PROCEDURE — 80053 COMPREHEN METABOLIC PANEL: CPT

## 2024-12-12 PROCEDURE — 82947 ASSAY GLUCOSE BLOOD QUANT: CPT

## 2024-12-12 PROCEDURE — 86901 BLOOD TYPING SEROLOGIC RH(D): CPT

## 2024-12-12 RX ORDER — IBUPROFEN 200 MG
1 TABLET ORAL
Qty: 0 | Refills: 0 | DISCHARGE
Start: 2024-12-12

## 2024-12-12 RX ORDER — ACETAMINOPHEN 500MG 500 MG/1
3 TABLET, COATED ORAL
Qty: 0 | Refills: 0 | DISCHARGE
Start: 2024-12-12

## 2024-12-12 RX ADMIN — Medication 600 MILLIGRAM(S): at 06:26

## 2024-12-12 RX ADMIN — ACETAMINOPHEN 500MG 975 MILLIGRAM(S): 500 TABLET, COATED ORAL at 09:23

## 2024-12-12 RX ADMIN — Medication 600 MILLIGRAM(S): at 05:44

## 2024-12-12 RX ADMIN — Medication 600 MILLIGRAM(S): at 13:00

## 2024-12-12 RX ADMIN — Medication 5000 UNIT(S): at 12:01

## 2024-12-12 RX ADMIN — ACETAMINOPHEN 500MG 975 MILLIGRAM(S): 500 TABLET, COATED ORAL at 15:14

## 2024-12-12 RX ADMIN — Medication 600 MILLIGRAM(S): at 00:46

## 2024-12-12 RX ADMIN — ACETAMINOPHEN 500MG 975 MILLIGRAM(S): 500 TABLET, COATED ORAL at 08:23

## 2024-12-12 RX ADMIN — ACETAMINOPHEN 500MG 975 MILLIGRAM(S): 500 TABLET, COATED ORAL at 02:26

## 2024-12-12 RX ADMIN — ACETAMINOPHEN 500MG 975 MILLIGRAM(S): 500 TABLET, COATED ORAL at 03:15

## 2024-12-12 RX ADMIN — Medication 80 MILLIGRAM(S): at 08:23

## 2024-12-12 RX ADMIN — ACETAMINOPHEN 500MG 975 MILLIGRAM(S): 500 TABLET, COATED ORAL at 15:52

## 2024-12-12 RX ADMIN — Medication 600 MILLIGRAM(S): at 12:01

## 2024-12-12 NOTE — PROGRESS NOTE ADULT - ATTENDING COMMENTS
Pt is s/p  section. She is doing well without complaints. Denies HA, lightheadedness, dizziness, CP, SOB, palpitations, abdominal pain, heavy vaginal bleeding.     T(C): 36.4 (24 @ 05:37), Max: 36.7 (12-10-24 @ 17:00)  HR: 81 (24 @ 05:37) (81 - 97)  BP: 103/68 (24 @ 05:37) (98/61 - 123/62)  RR: 18 (24 @ 05:37) (18 - 18)  SpO2: 98% (24 @ 05:37) (97% - 100%)    Physical exam:   Abd: NTND, fundus firm and well contracted, incision CDI  VE: Appropriate vaginal bleeding    Plan  -Continue routine post partum care  -Monitor VS  -Agree with above plan/examination    ryan
Pt is s/p  section. She is doing well without complaints. Denies HA, lightheadedness, dizziness, CP, SOB, palpitations, abdominal pain, heavy vaginal bleeding.     T(C): 36.7 (24 @ 05:24), Max: 36.8 (24 @ 09:19)  HR: 83 (24 @ 05:24) (82 - 90)  BP: 103/66 (24 @ 05:24) (103/66 - 122/78)  RR: 18 (24 @ 05:24) (18 - 18)  SpO2: 99% (24 @ 05:24) (98% - 100%)    Physical exam:   Abd: NTND, fundus firm and well contracted, incision CDI  VE: Appropriate vaginal bleeding    Plan  -Continue routine post partum care  -Monitor VS  -Agree with above plan/examination    ryan
Agree with assessment and plan. Pt doing well without complaints.   Vitals stable. Exam Benign  - c/w methergine series. s/p Txf 1 U PRBCs  - Routine post partum care

## 2024-12-12 NOTE — PROGRESS NOTE ADULT - SUBJECTIVE AND OBJECTIVE BOX
Day 1 of Anesthesia Pain Management Service    SUBJECTIVE:  Pain Scale Score:          [X] Refer to charted pain scores    THERAPY: Received PF spinal morphine as above    OBJECTIVE:    Sedation:        	[X] Alert	[ ] Drowsy	[ ] Arousable      [ ] Asleep       [ ] Unresponsive    Side Effects:	[X] None	[ ] Nausea	[ ] Vomiting         [ ] Pruritus  		[ ] Weakness            [ ] Numbness	          [ ] Other:    ASSESSMENT/ PLAN  [X] Patient transitioned to prn analgesics  [X] Pain management per primary service, pain service to sign off   [X]Documentation and Verification of current medications
OB Progress Note: LTCS, POD#3    S: 38yo POD#3 s/p pLTCS for placenta previa. Pain is well controlled. She is tolerating a regular diet and passing flatus. She is voiding spontaneously, and ambulating without difficulty. Denies CP/SOB. Denies lightheadedness/dizziness. Denies N/V.    O:  Vitals:  Vital Signs Last 24 Hrs  T(C): 36.7 (12 Dec 2024 05:24), Max: 36.8 (11 Dec 2024 09:19)  T(F): 98.1 (12 Dec 2024 05:24), Max: 98.2 (11 Dec 2024 09:19)  HR: 83 (12 Dec 2024 05:24) (82 - 90)  BP: 103/66 (12 Dec 2024 05:24) (103/66 - 122/78)  BP(mean): --  RR: 18 (12 Dec 2024 05:24) (18 - 18)  SpO2: 99% (12 Dec 2024 05:24) (98% - 100%)    Parameters below as of 11 Dec 2024 21:01  Patient On (Oxygen Delivery Method): room air        MEDICATIONS  (STANDING):  acetaminophen     Tablet .. 975 milliGRAM(s) Oral <User Schedule>  diphtheria/tetanus/pertussis (acellular) Vaccine (Adacel) 0.5 milliLiter(s) IntraMuscular once  heparin   Injectable 5000 Unit(s) SubCutaneous every 12 hours  ibuprofen  Tablet. 600 milliGRAM(s) Oral every 6 hours  lactated ringers. 1000 milliLiter(s) (125 mL/Hr) IV Continuous <Continuous>  oxytocin Infusion 42 milliUNIT(s)/Min (42 mL/Hr) IV Continuous <Continuous>  oxytocin Infusion 167 milliUNIT(s)/Min (167 mL/Hr) IV Continuous <Continuous>    MEDICATIONS  (PRN):  diphenhydrAMINE 25 milliGRAM(s) Oral every 6 hours PRN Pruritus  lanolin Ointment 1 Application(s) Topical every 6 hours PRN Sore Nipples  magnesium hydroxide Suspension 30 milliLiter(s) Oral two times a day PRN Constipation  oxyCODONE    IR 5 milliGRAM(s) Oral every 3 hours PRN Moderate to Severe Pain (4-10)  oxyCODONE    IR 5 milliGRAM(s) Oral once PRN Moderate to Severe Pain (4-10)  simethicone 80 milliGRAM(s) Chew every 4 hours PRN Gas      LABS:  Blood type: A Positive  Rubella IgG: RPR: Negative                          9.9[L]   17.25[H] >-----------< 122[L]    ( 12-10 @ 06:42 )             29.0[L]                        11.3[L]   20.15[H] >-----------< 114[L]    ( 12-09 @ 14:32 )             32.0[L]                  Physical exam:  Gen: NAD  Abdomen: Soft, nontender, no distension , firm uterine fundus at umbilicus.  Incision: Clean, dry, and intact   Pelvic: Normal lochia noted  Ext: No calf tenderness    
Day 1 of Anesthesia Pain Management Service    SUBJECTIVE: Doing ok  Pain Scale Score:          [X] Refer to charted pain scores    THERAPY:    s/p    100 mcg PF morphine on 12\9\24      MEDICATIONS  (STANDING):  acetaminophen     Tablet .. 975 milliGRAM(s) Oral <User Schedule>  diphtheria/tetanus/pertussis (acellular) Vaccine (Adacel) 0.5 milliLiter(s) IntraMuscular once  heparin   Injectable 5000 Unit(s) SubCutaneous every 12 hours  ibuprofen  Tablet. 600 milliGRAM(s) Oral every 6 hours  ketorolac   Injectable 30 milliGRAM(s) IV Push every 6 hours  lactated ringers. 1000 milliLiter(s) (125 mL/Hr) IV Continuous <Continuous>  oxytocin Infusion 42 milliUNIT(s)/Min (42 mL/Hr) IV Continuous <Continuous>  oxytocin Infusion 167 milliUNIT(s)/Min (167 mL/Hr) IV Continuous <Continuous>    MEDICATIONS  (PRN):  diphenhydrAMINE 25 milliGRAM(s) Oral every 6 hours PRN Pruritus  lanolin Ointment 1 Application(s) Topical every 6 hours PRN Sore Nipples  magnesium hydroxide Suspension 30 milliLiter(s) Oral two times a day PRN Constipation  oxyCODONE    IR 5 milliGRAM(s) Oral every 3 hours PRN Moderate to Severe Pain (4-10)  oxyCODONE    IR 5 milliGRAM(s) Oral once PRN Moderate to Severe Pain (4-10)  simethicone 80 milliGRAM(s) Chew every 4 hours PRN Gas      OBJECTIVE:    Sedation:        	[X] Alert	 [ ] Drowsy	[ ] Arousable      [ ] Asleep       [ ] Unresponsive    Side Effects:	[X] None 	[ ] Nausea	[ ] Vomiting         [ ] Pruritus  		[ ] Weakness            [ ] Numbness	          [ ] Other:    Vital Signs Last 24 Hrs  T(C): 36.6 (10 Dec 2024 10:00), Max: 37 (09 Dec 2024 16:15)  T(F): 97.9 (10 Dec 2024 10:00), Max: 98.6 (09 Dec 2024 16:15)  HR: 97 (10 Dec 2024 10:00) (97 - 130)  BP: 123/62 (10 Dec 2024 10:00) (90/40 - 124/57)  BP(mean): 86 (09 Dec 2024 18:50) (86 - 86)  RR: 18 (10 Dec 2024 10:00) (18 - 26)  SpO2: 100% (10 Dec 2024 10:00) (95% - 100%)    Parameters below as of 10 Dec 2024 10:00  Patient On (Oxygen Delivery Method): room air        ASSESSMENT/ PLAN: Endorsing some incisional pain improved with Toradol   [X] Patient transitioned to prn analgesics  [X] Pain management per primary service, pain service to sign off   [X]Documentation and Verification of current medications
OB Progress Note:  Delivery, POD#1    S: 38yo POD#1 s/p pLTCS for placenta previa. Her pain is well controlled. She is tolerating a regular diet. She is passing flatus. Denies N/V. Denies CP/SOB/lightheadedness/dizziness.   She is ambulating without difficulty.   Voiding spontaneously.     O:   Vital Signs Last 24 Hrs  T(C): 36.8 (10 Dec 2024 05:53), Max: 37 (09 Dec 2024 16:15)  T(F): 98.3 (10 Dec 2024 05:53), Max: 98.6 (09 Dec 2024 16:15)  HR: 98 (10 Dec 2024 05:53) (98 - 130)  BP: 113/76 (10 Dec 2024 05:53) (90/40 - 144/73)  BP(mean): 86 (09 Dec 2024 18:50) (86 - 86)  RR: 18 (10 Dec 2024 05:53) (16 - 26)  SpO2: 98% (10 Dec 2024 05:53) (94% - 100%)    Parameters below as of 10 Dec 2024 05:53  Patient On (Oxygen Delivery Method): room air        Labs:  Blood type: A Positive  Rubella IgG: RPR: Negative                          9.9[L]   17.25[H] >-----------< 122[L]    ( 12-10 @ 06:42 )             29.0[L]                        11.3[L]   20.15[H] >-----------< 114[L]    (  @ 14:32 )             32.0[L]                        11.7   9.50 >-----------< 124[L]    (  @ 06:29 )             34.4[L]    24 @ 06:29      137  |  105  |  8   ----------------------------<  106[H]  3.6   |  18[L]  |  0.39[L]        Ca    8.9      09 Dec 2024 06:29    TPro  6.0  /  Alb  3.4  /  TBili  0.2  /  DBili  x   /  AST  16  /  ALT  14  /  AlkPhos  130[H]  24 @ 06:29          PE:  General: NAD  Abdomen: Mildly distended, appropriately tender, incision c/d/i.  Extremities: No erythema, no pitting edema    
OB Progress Note: LTCS, POD#2    S: 38yo POD#2 s/p pLTCS for placenta previa. Pain is well controlled. She is tolerating a regular diet and passing flatus. She is voiding spontaneously, and ambulating without difficulty. Denies CP/SOB. Denies lightheadedness/dizziness. Denies N/V.    O:  Vitals:  Vital Signs Last 24 Hrs  T(C): 36.4 (11 Dec 2024 05:37), Max: 36.7 (10 Dec 2024 17:00)  T(F): 97.6 (11 Dec 2024 05:37), Max: 98.1 (10 Dec 2024 17:00)  HR: 81 (11 Dec 2024 05:37) (81 - 97)  BP: 103/68 (11 Dec 2024 05:37) (98/61 - 123/62)  BP(mean): --  RR: 18 (11 Dec 2024 05:37) (18 - 18)  SpO2: 98% (11 Dec 2024 05:37) (97% - 100%)    Parameters below as of 11 Dec 2024 05:37  Patient On (Oxygen Delivery Method): room air        MEDICATIONS  (STANDING):  acetaminophen     Tablet .. 975 milliGRAM(s) Oral <User Schedule>  diphtheria/tetanus/pertussis (acellular) Vaccine (Adacel) 0.5 milliLiter(s) IntraMuscular once  heparin   Injectable 5000 Unit(s) SubCutaneous every 12 hours  ibuprofen  Tablet. 600 milliGRAM(s) Oral every 6 hours  lactated ringers. 1000 milliLiter(s) (125 mL/Hr) IV Continuous <Continuous>  oxytocin Infusion 42 milliUNIT(s)/Min (42 mL/Hr) IV Continuous <Continuous>  oxytocin Infusion 167 milliUNIT(s)/Min (167 mL/Hr) IV Continuous <Continuous>      MEDICATIONS  (PRN):  diphenhydrAMINE 25 milliGRAM(s) Oral every 6 hours PRN Pruritus  lanolin Ointment 1 Application(s) Topical every 6 hours PRN Sore Nipples  magnesium hydroxide Suspension 30 milliLiter(s) Oral two times a day PRN Constipation  oxyCODONE    IR 5 milliGRAM(s) Oral every 3 hours PRN Moderate to Severe Pain (4-10)  oxyCODONE    IR 5 milliGRAM(s) Oral once PRN Moderate to Severe Pain (4-10)  simethicone 80 milliGRAM(s) Chew every 4 hours PRN Gas      Labs:  Blood type: A Positive  Rubella IgG: RPR: Negative                          9.9[L]   17.25[H] >-----------< 122[L]    ( 12-10 @ 06:42 )             29.0[L]                        11.3[L]   20.15[H] >-----------< 114[L]    ( 12-09 @ 14:32 )             32.0[L]                        11.7   9.50 >-----------< 124[L]    ( 12-09 @ 06:29 )             34.4[L]    12-09-24 @ 06:29      137  |  105  |  8   ----------------------------<  106[H]  3.6   |  18[L]  |  0.39[L]        Ca    8.9      09 Dec 2024 06:29    TPro  6.0  /  Alb  3.4  /  TBili  0.2  /  DBili  x   /  AST  16  /  ALT  14  /  AlkPhos  130[H]  12-09-24 @ 06:29          PE:  General: NAD  Abdomen: Soft, appropriately tender, incision c/d/i.  Extremities: No erythema, no pitting edema

## 2024-12-12 NOTE — DISCHARGE NOTE OB - DO NOT DIET OR “STARVE” YOURSELF INTO GETTING BACK TO PRE-PREGNANCY SHAPE
Is This A New Presentation, Or A Follow-Up?: Warts How Severe Are Your Warts?: mild Statement Selected

## 2024-12-12 NOTE — DISCHARGE NOTE OB - CARE PROVIDER_API CALL
Art Villavicencio  Obstetrics and Gynecology  [address]], [facility] [address]  [place] [address]  Phone: [phone]  Fax: [phone]  Follow Up Time:

## 2024-12-12 NOTE — PROGRESS NOTE ADULT - ASSESSMENT
A/P: 38yo POD#2 s/p pLTCS @31w4d for actively bleeding placenta previa.  Patient is stable and doing well post-operatively.    #PTD  - s/p BMZ and Magnesium    #PPH  - QBL 1500   - s/p 1uPRBC  - s/p TXA, cytoPR, IM Methergine  - s/p Methergine series   - Hct: 34.4->1uPRBC->32->29  - coags wnl    #postpartum   - Continue regular diet.  - Increase ambulation.  - Continue motrin, tylenol, oxycodone PRN for pain control.      Kenyatta Anguiano, PGY1  
A/P: 40yo POD#1 s/p pLTCS @31w4d for actively bleeding placenta previa.  Patient is stable and doing well post-operatively.    #PTD  - s/p BMZ and Magnesium    #PPH  - QBL 1500   - s/p 1uPRBC  - s/p TXA, cytoPR, IM methergine   - continue methergine series (12/9-)  - Hct: 34.4->1uPRBC->32->29  - coags wnl    #postpartum   - Continue regular diet.  - Increase ambulation.  - Continue motrin, tylenol, oxycodone PRN for pain control.      Kenyatta Anguiano, PGY1  
A/P: 40yo POD#3 s/p pLTCS @31w4d for actively bleeding placenta previa.  Patient is stable and doing well post-operatively.    #PTD  - s/p BMZ and Magnesium    #PPH  - QBL 1500   - s/p 1uPRBC  - s/p TXA, cytoPR, IM Methergine  - s/p Methergine series   - Hct: 34.4->1uPRBC->32->29  - coags wnl    #postpartum   - Continue regular diet.  - Increase ambulation.  - Continue motrin, tylenol, oxycodone PRN for pain control.      Kenyatta Anguiano, PGY1

## 2024-12-12 NOTE — DISCHARGE NOTE OB - CARE PLAN
Principal Discharge DX:	 delivery delivered  Assessment and plan of treatment:	After discharge, please stay on pelvic rest for 6 weeks, meaning no sexual intercourse, no tampons and no douching.  No driving for 2 weeks as women can loose a lot of blood during delivery and there is a possibility of being lightheaded/fainting.  No lifting objects heavier than a gallon of milk for two weeks.  Expect to have vaginal bleeding/spotting for up to six weeks.  The bleeding should get lighter and more white/light brown with time.  For bleeding soaking more than a pad an hour or passing clots greater than the size of your fist, come in to the emergency department.    Follow up in the office in 2-3 weeks for incision check.  Call the office for noticeable increase in redness or swelling at incision, discharge from incision, or opening of skin at incision site.     You can take up to 600mg Ibuprofen every 6 hours and/or tylenol 1000mg every 6 hours. Alternate medications every 3 hours (Take Ibuprofen in the morning and then tylenol 3 hours later)   1

## 2024-12-12 NOTE — DISCHARGE NOTE OB - PLAN OF CARE
After discharge, please stay on pelvic rest for 6 weeks, meaning no sexual intercourse, no tampons and no douching.  No driving for 2 weeks as women can loose a lot of blood during delivery and there is a possibility of being lightheaded/fainting.  No lifting objects heavier than a gallon of milk for two weeks.  Expect to have vaginal bleeding/spotting for up to six weeks.  The bleeding should get lighter and more white/light brown with time.  For bleeding soaking more than a pad an hour or passing clots greater than the size of your fist, come in to the emergency department.    Follow up in the office in 2-3 weeks for incision check.  Call the office for noticeable increase in redness or swelling at incision, discharge from incision, or opening of skin at incision site.     You can take up to 600mg Ibuprofen every 6 hours and/or tylenol 1000mg every 6 hours. Alternate medications every 3 hours (Take Ibuprofen in the morning and then tylenol 3 hours later)

## 2024-12-12 NOTE — DISCHARGE NOTE OB - REST! DO NOT DO HEAVY HOUSEWORK, LIFTING OR STRENOUS EXERCISE FOR TWO WEEKS
Mom struggling some to get baby latched deeply. I gave mom some tips on positioning the baby at the breast and helped her get baby latched. Baby has a slightly recessed chin and a possible tight frenulum. We talked about positioning the baby at the breast and how mom can help him get a deep latch. She will continue to work on latching. If baby is struggling she can hand express and give colostrum. Statement Selected

## 2024-12-12 NOTE — DISCHARGE NOTE OB - HOSPITAL COURSE
Patient had uncomplicated low transverse  section @ 31wks for bleeding placental previa. Please see operative note for details.  During postpartum course patient's vitals were stable, vaginal bleeding appropriate, and pain well controlled.  Post operation day one hematocrit was appropriate.  On day of discharge patient was ambulating, her pain controlled with oral medications, had adequate oral intake, and was voiding freely.  Discharge instructions and precautions were given.  Will return to the office in 2-3 weeks for incision check

## 2024-12-12 NOTE — DISCHARGE NOTE OB - PATIENT PORTAL LINK FT
You can access the FollowMyHealth Patient Portal offered by [facility] by registering at the following website: [email or web address]. By joining [facility]’s FollowMyHealth portal, you will also be able to view your health information using other applications (apps) compatible with our system.

## 2024-12-12 NOTE — DISCHARGE NOTE OB - FINANCIAL ASSISTANCE
[facility] provides services at a reduced cost to those who are determined to be eligible through [facility]’s financial assistance program. Information regarding [facility]’s financial assistance program can be found by going to [email or web address] or by calling [phone].

## 2024-12-31 LAB — SURGICAL PATHOLOGY STUDY: SIGNIFICANT CHANGE UP

## 2025-06-27 NOTE — OB RN DELIVERY SUMMARY - BABY A: WEIGHT (GM) DELIVERY
Patient verified by 2 identifiers and allergies reviewed.  IV in place to Rt AC.  DSE explained to patient, consent obtained & testing completed.  Pt C/O nausea during peak testing which resolved during the recovery period. IV flushed post testing.  Post study discharge instructions reviewed with patient, patient verbalized understanding.  Patient transferred back to room via wc accompanied by escort in stable condition.   1800

## (undated) DEVICE — TUBING UTERINE ASPIRATION 3/8" X 6FT W/O ADAPTER

## (undated) DEVICE — Device

## (undated) DEVICE — WARMING BLANKET UPPER ADULT

## (undated) DEVICE — SUT POLYSORB 1 36" GS-21

## (undated) DEVICE — STAPLER SKIN VISI-STAT 35 WIDE

## (undated) DEVICE — BLADE SCALPEL SAFETYLOCK #10

## (undated) DEVICE — MEDICATION LABELS W MARKER

## (undated) DEVICE — GLV 7 PROTEXIS (WHITE)

## (undated) DEVICE — FOLEY TRAY 16FR LF URINE METER SURESTEP

## (undated) DEVICE — PACK MAJOR ABDOMINAL SUPINE

## (undated) DEVICE — SUT CHROMIC 2-0 36" CT-1

## (undated) DEVICE — VACUUM CURETTE BERKLEY OLYMPUS F TIP 6MM

## (undated) DEVICE — VACUUM CURETTE MEDGYN CURVED 13MM

## (undated) DEVICE — SUT QUILL MONODERM 3-0 30CM 26MM

## (undated) DEVICE — VACUUM CURETTE BUSSE HOSP STRAIGHT 7MM

## (undated) DEVICE — DRSG STERISTRIPS 0.5 X 4"

## (undated) DEVICE — VACUUM CURETTE BERKLEY OLYMPUS CURVED 11MM

## (undated) DEVICE — VACUUM CURETTE BUSSE HOSP CURVED 14MM

## (undated) DEVICE — DRAPE INSTRUMENT POUCH 6.75" X 11"

## (undated) DEVICE — VACUUM CURETTE BUSSE HOSP CURVED 11MM

## (undated) DEVICE — VACUUM CURETTE BUSSE HOSP CURVED 12MM

## (undated) DEVICE — DRSG OPSITE 13.75 X 4"

## (undated) DEVICE — DRAPE LIGHT HANDLE COVER (GREEN)

## (undated) DEVICE — SOL IRR POUR NS 0.9% 500ML

## (undated) DEVICE — VACUUM CURETTE BERKLEY OLYMPUS CURVED 8MM

## (undated) DEVICE — VACUUM CURETTE BERKLEY OLYMPUS CURVED 12MM

## (undated) DEVICE — VENODYNE/SCD SLEEVE CALF MEDIUM

## (undated) DEVICE — GOWN TRIMAX LG

## (undated) DEVICE — LAP PAD 18 X 18"

## (undated) DEVICE — SUT BIOSYN 1 36" GS-25

## (undated) DEVICE — VACUUM CURETTE BERKLEY OLYMPUS CURVED 16MM X 1/2"

## (undated) DEVICE — SOL IRR POUR H2O 250ML

## (undated) DEVICE — POSITIONER FOAM EGG CRATE ULNAR 2PCS (PINK)

## (undated) DEVICE — SPECIMEN CONTAINER 100ML

## (undated) DEVICE — ABDOMINAL BINDER XL 9" X 62"-74"

## (undated) DEVICE — GLV 7.5 PROTEXIS (WHITE)

## (undated) DEVICE — DRAPE 1/2 SHEET 40X57"

## (undated) DEVICE — VACUUM CURETTE BERKLEY OLYMPUS CURVED 7MM

## (undated) DEVICE — VACUUM CURETTE BUSSE HOSP CURVED 10MM

## (undated) DEVICE — VACUUM CURETTE BERKLEY OLYMPUS CURVED 9MM

## (undated) DEVICE — SOCK SPECIMEN 3/8"-1/2" MALE PORT

## (undated) DEVICE — PACK LITHOTOMY

## (undated) DEVICE — VISITEC 4X4

## (undated) DEVICE — DRAPE TOWEL BLUE 17" X 24"

## (undated) DEVICE — POSITIONER PATIENT SAFETY STRAP 3X60"

## (undated) DEVICE — VACUUM CURETTE BUSSE HOSP CURVED 9MM

## (undated) DEVICE — PREP BETADINE KIT

## (undated) DEVICE — SUT CHROMIC 2-0 30" V-20